# Patient Record
Sex: FEMALE | Race: WHITE | NOT HISPANIC OR LATINO | Employment: FULL TIME | URBAN - NONMETROPOLITAN AREA
[De-identification: names, ages, dates, MRNs, and addresses within clinical notes are randomized per-mention and may not be internally consistent; named-entity substitution may affect disease eponyms.]

---

## 2017-03-30 ENCOUNTER — OFFICE VISIT (OUTPATIENT)
Dept: CARDIOLOGY | Facility: CLINIC | Age: 27
End: 2017-03-30

## 2017-03-30 VITALS
HEART RATE: 80 BPM | SYSTOLIC BLOOD PRESSURE: 105 MMHG | DIASTOLIC BLOOD PRESSURE: 72 MMHG | HEIGHT: 68 IN | OXYGEN SATURATION: 100 % | BODY MASS INDEX: 24.43 KG/M2 | WEIGHT: 161.2 LBS

## 2017-03-30 DIAGNOSIS — R07.89 OTHER CHEST PAIN: Primary | ICD-10-CM

## 2017-03-30 DIAGNOSIS — R00.2 PALPITATIONS: ICD-10-CM

## 2017-03-30 DIAGNOSIS — R06.02 SHORTNESS OF BREATH: ICD-10-CM

## 2017-03-30 PROBLEM — R07.9 CHEST PAIN: Status: ACTIVE | Noted: 2017-03-30

## 2017-03-30 PROCEDURE — 99204 OFFICE O/P NEW MOD 45 MIN: CPT | Performed by: PHYSICIAN ASSISTANT

## 2017-03-30 PROCEDURE — 93000 ELECTROCARDIOGRAM COMPLETE: CPT | Performed by: PHYSICIAN ASSISTANT

## 2017-03-30 NOTE — PROGRESS NOTES
Subjective   Nesha Aviles is a 26 y.o. female     Chief Complaint   Patient presents with   • Establish Care   • Palpitations       HPI    Patient is a 26-year-old female with no history of coronary artery disease or congenital heart disease that presents to our office today to establish care referred in the setting of palpitations.    Patient has long-standing history of palpitations. Apparently she has seen Dr. muhammad in the past in which she has had stress test to perform an echocardiogram which per her report were unremarkable. We currently do not have any records. For several years she has seen Dr. Post in [place] who has been monitoring patient. Apparently she has poor Holter monitors demonstrating premature beats by description. Again we have no records. She had been on beta blocker in the past and had failed that therapy.    She describes to me recently expressing more palpitations. She describes an irregular heartbeat that will occur sometimes after activity but she does notice it as rest as well. Her palpitations to cause moderate discomfort and dizziness. She will have mild dyspnea during palpitations but has no significant dyspnea with exertion or at baseline. She has no chest discomfort outside of palpitations. She denies PND orthopnea. Otherwise, she voices no other complaints      No current outpatient prescriptions on file.     No current facility-administered medications for this visit.        Review of patient's allergies indicates no known allergies.    Past Medical History:   Diagnosis Date   • Arrhythmia    • Hypothyroidism     when younger       Social History     Social History   • Marital status:      Spouse name: N/A   • Number of children: N/A   • Years of education: N/A     Occupational History   • Not on file.     Social History Main Topics   • Smoking status: Never Smoker   • Smokeless tobacco: Not on file   • Alcohol use No   • Drug use: No   • Sexual activity: Not on  "file     Other Topics Concern   • Not on file     Social History Narrative   • No narrative on file       @[facility]@    Review of Systems   Constitutional: Positive for fatigue.   HENT: Negative.    Eyes: Positive for visual disturbance (glasses and contacts).   Respiratory: Positive for shortness of breath.    Cardiovascular: Positive for chest pain (off and on) and palpitations. Negative for leg swelling.   Gastrointestinal: Negative.    Endocrine: Negative.    Genitourinary: Negative.    Musculoskeletal: Negative.    Skin: Negative.    Allergic/Immunologic: Negative.    Neurological: Negative.    Hematological: Negative.    Psychiatric/Behavioral: Negative.        Objective     /72 (BP Location: Left arm, Patient Position: Sitting)  Pulse 80  Ht 68\" (172.7 cm)  Wt 161 lb 3.2 oz (73.1 kg)  SpO2 100%  BMI 24.51 kg/m2    Lab Results (most recent)     None          Physical Exam   Constitutional: She is oriented to person, place, and time. She appears well-developed and well-nourished. No distress.   HENT:   Head: Normocephalic and atraumatic.   Eyes: EOM are normal. Pupils are equal, round, and reactive to light.   Neck: No JVD present.   Cardiovascular: Normal rate, regular rhythm and normal heart sounds.  Exam reveals no gallop and no friction rub.    No murmur heard.  Pulmonary/Chest: Effort normal and breath sounds normal. No respiratory distress. She has no wheezes. She has no rales. She exhibits no tenderness.   Abdominal: Soft.   Musculoskeletal: Normal range of motion. She exhibits no edema.   Neurological: She is alert and oriented to person, place, and time. No cranial nerve deficit.   Skin: Skin is warm and dry. No rash noted. No erythema. No pallor.   Psychiatric: She has a normal mood and affect. Her behavior is normal.   Nursing note and vitals reviewed.      Procedure     ECG 12 Lead  Date/Time: 3/30/2017 9:24 AM  Performed by: SKYLER BEY  Authorized by: SKYLER BEY "   Comments: EKG indication palpitations    EKG demonstrates sinus mechanism at 73 bpm, normal axis, no acute ST changes and otherwise normal                 Assessment/Plan     Problems Addressed this Visit        Cardiovascular and Mediastinum    Palpitations    Relevant Orders    ECG 12 Lead    Cardiac Event Monitor    Thyroid Panel With TSH    Comprehensive Metabolic Panel    CBC & Differential    Magnesium       Respiratory    Shortness of breath    Relevant Orders    Cardiac Event Monitor    Thyroid Panel With TSH    Comprehensive Metabolic Panel    CBC & Differential    Magnesium       Nervous and Auditory    Chest pain - Primary    Relevant Orders    ECG 12 Lead    Cardiac Event Monitor    Thyroid Panel With TSH    Comprehensive Metabolic Panel    CBC & Differential    Magnesium                Recommendations  1. I would like to evaluate patient's arrhythmic substrate further with event monitoring. By description, it does appear that her symptoms seem most consistent with premature beat such as PVCs or PACs, we would like to verify because of the worsening intensity of palpitations and symptoms of dizziness.  2. In regards to her chest pain, I feel that it is very likely that ischemia was driving patient's symptoms as moderate discomfort is only fell when she has palpitations. Chest pain is atypical, risk factors are not present, and patient has had workup in the past with the last 2-3 years including echocardiogram. We would like to obtain records, for now I will not perform any diagnostic imaging a 2 week and review records and review event monitoring.  3. I would like to do a routine workup as she states she is not had recent blood work performed. We would like to rule out thyroid abnormalities, electrolyte abnormalities etc. as a cause of patient's palpitations.  4. We will see her back for follow-up of above testing. Follow-up with primary as scheduled

## 2017-04-16 ENCOUNTER — OUTSIDE FACILITY SERVICE (OUTPATIENT)
Dept: CARDIOLOGY | Facility: CLINIC | Age: 27
End: 2017-04-16

## 2017-04-16 PROCEDURE — 93272 ECG/REVIEW INTERPRET ONLY: CPT | Performed by: INTERNAL MEDICINE

## 2017-05-09 ENCOUNTER — OFFICE VISIT (OUTPATIENT)
Dept: CARDIOLOGY | Facility: CLINIC | Age: 27
End: 2017-05-09

## 2017-05-09 VITALS
BODY MASS INDEX: 24.46 KG/M2 | OXYGEN SATURATION: 100 % | WEIGHT: 161.4 LBS | HEART RATE: 81 BPM | HEIGHT: 68 IN | DIASTOLIC BLOOD PRESSURE: 63 MMHG | SYSTOLIC BLOOD PRESSURE: 99 MMHG

## 2017-05-09 DIAGNOSIS — R06.02 SHORTNESS OF BREATH: ICD-10-CM

## 2017-05-09 DIAGNOSIS — R00.0 TACHYCARDIA: ICD-10-CM

## 2017-05-09 DIAGNOSIS — R00.2 PALPITATIONS: Primary | ICD-10-CM

## 2017-05-09 PROCEDURE — 99213 OFFICE O/P EST LOW 20 MIN: CPT | Performed by: PHYSICIAN ASSISTANT

## 2017-05-10 PROBLEM — R00.0 TACHYCARDIA: Status: ACTIVE | Noted: 2017-05-10

## 2017-08-22 ENCOUNTER — OFFICE VISIT (OUTPATIENT)
Dept: CARDIOLOGY | Facility: CLINIC | Age: 27
End: 2017-08-22

## 2017-08-22 VITALS
OXYGEN SATURATION: 100 % | DIASTOLIC BLOOD PRESSURE: 67 MMHG | HEART RATE: 88 BPM | BODY MASS INDEX: 24.86 KG/M2 | WEIGHT: 164 LBS | HEIGHT: 68 IN | SYSTOLIC BLOOD PRESSURE: 102 MMHG

## 2017-08-22 DIAGNOSIS — R60.0 LOCALIZED EDEMA: ICD-10-CM

## 2017-08-22 DIAGNOSIS — R00.2 PALPITATIONS: ICD-10-CM

## 2017-08-22 DIAGNOSIS — R06.02 SHORTNESS OF BREATH: Primary | ICD-10-CM

## 2017-08-22 DIAGNOSIS — R00.0 TACHYCARDIA: ICD-10-CM

## 2017-08-22 PROCEDURE — 99213 OFFICE O/P EST LOW 20 MIN: CPT | Performed by: PHYSICIAN ASSISTANT

## 2017-08-22 NOTE — PROGRESS NOTES
Problem list     Subjective   Nesha Aviles is a 27 y.o. female     Chief Complaint   Patient presents with   • Palpitations   • Edema     michael. in calves   • Rapid Heart Rate       HPI  The patient presents in today for routine evaluation.  We've seen her in the setting of tachycardia and palpitations in the past.  She had an event monitor which indicated sinus tachycardia, correlating with symptoms.  We reviewed the possibility of institution of low-dose beta blocker for treatment of symptoms.  The patient felt that symptoms were so minimal that she did not want to pursue treatment.  She continues to have intermittent tachycardia and palpitations otherwise.  She reports that this is fairly mild.  Her big concern at this point is with edema.  She notes by the end of the day increasing edema, pitting by description.  She has slight increase in dyspnea.  She has no PND or orthopnea.  She reports that her edema has resolved by the morning.  I again discussed consideration for low-dose diuretic therapy.  She does not want to pursue that either.  She would rather pursue physical maneuvers, for which she is well versed.  She has only rare chest pain, which she feels is anxiety related to current symptoms.  She did have labs earlier this year which indicated mild anemia, iron deficiency pattern.  Thyroid, chemistry, and CBC findings otherwise were unremarkable.      No outpatient encounter prescriptions on file as of 8/22/2017.     No facility-administered encounter medications on file as of 8/22/2017.        Review of patient's allergies indicates no known allergies.    Past Medical History:   Diagnosis Date   • Arrhythmia    • Hypothyroidism     when younger       Social History     Social History   • Marital status:      Spouse name: N/A   • Number of children: N/A   • Years of education: N/A     Occupational History   • Not on file.     Social History Main Topics   • Smoking status: Never Smoker   • Smokeless  "tobacco: Not on file   • Alcohol use No   • Drug use: No   • Sexual activity: Not on file     Other Topics Concern   • Not on file     Social History Narrative       Family History   Problem Relation Age of Onset   • Hypertension Mother    • No Known Problems Father        Review of Systems   Constitutional: Positive for fatigue.   HENT: Negative.    Eyes: Positive for visual disturbance (glasses).   Respiratory: Negative.    Cardiovascular: Positive for chest pain (\" a little bit of uncomfortable\"), palpitations and leg swelling (michael. in calves).   Gastrointestinal: Negative.    Endocrine: Negative.    Genitourinary: Negative.    Musculoskeletal: Negative.    Skin: Negative.    Allergic/Immunologic: Negative.    Neurological: Negative.    Hematological: Bruises/bleeds easily (michael. in calves).   Psychiatric/Behavioral: Negative.        Objective     /67 (BP Location: Left arm, Patient Position: Sitting)  Pulse 88  Ht 68\" (172.7 cm)  Wt 164 lb (74.4 kg)  SpO2 100%  BMI 24.94 kg/m2    Lab Results (most recent)     None          Physical Exam   Constitutional: She is oriented to person, place, and time. She appears well-developed and well-nourished. No distress.   HENT:   Head: Normocephalic and atraumatic.   Eyes: Conjunctivae and EOM are normal. Pupils are equal, round, and reactive to light.   Neck: Normal range of motion. Neck supple. No JVD present. No tracheal deviation present.   Cardiovascular: Normal rate, regular rhythm, normal heart sounds and intact distal pulses.    Pulmonary/Chest: Effort normal and breath sounds normal.   Abdominal: Soft. Bowel sounds are normal. She exhibits no distension and no mass. There is no tenderness. There is no rebound and no guarding.   Musculoskeletal: Normal range of motion. She exhibits no edema, tenderness or deformity.   Neurological: She is alert and oriented to person, place, and time.   Skin: Skin is warm and dry. No rash noted. No erythema. No pallor. "   Psychiatric: She has a normal mood and affect. Her behavior is normal. Judgment and thought content normal.   Nursing note and vitals reviewed.      Procedure   Procedures       Assessment/Plan     Problems Addressed this Visit        Cardiovascular and Mediastinum    Palpitations    Relevant Orders    Adult Transthoracic Echo Complete    CBC & Differential    Comprehensive Metabolic Panel    Lipid Panel    Tachycardia    Relevant Orders    Adult Transthoracic Echo Complete    CBC & Differential    Comprehensive Metabolic Panel    Lipid Panel       Respiratory    Shortness of breath - Primary    Relevant Orders    Adult Transthoracic Echo Complete    CBC & Differential    Comprehensive Metabolic Panel    Lipid Panel      Other Visit Diagnoses     Localized edema        Relevant Orders    Adult Transthoracic Echo Complete    CBC & Differential    Comprehensive Metabolic Panel    Lipid Panel           I will schedule the patient for an echo given symptoms as above.  I would also like to repeat labs.  We discussed physical maneuvers for her edema.  Also discussed consideration for diuretic therapy for her edema and even the possibility of low-dose beta blocker because of symptoms of palpitations and tachycardia.  She does not want medications at this time as she feels that she is doing too well.  Should her clinical course change, she will called us.  We'll see her after echocardiogram findings are known and recommend further to her at that time.  Otherwise, I feel nothing further is indicated.

## 2017-09-06 ENCOUNTER — HOSPITAL ENCOUNTER (OUTPATIENT)
Dept: CARDIOLOGY | Facility: HOSPITAL | Age: 27
Discharge: HOME | End: 2017-09-06
Admitting: PHYSICIAN ASSISTANT

## 2017-09-06 ENCOUNTER — OUTSIDE FACILITY SERVICE (OUTPATIENT)
Dept: CARDIOLOGY | Facility: CLINIC | Age: 27
End: 2017-09-06

## 2017-09-06 PROCEDURE — 93306 TTE W/DOPPLER COMPLETE: CPT

## 2017-09-06 PROCEDURE — 93306 TTE W/DOPPLER COMPLETE: CPT | Performed by: INTERNAL MEDICINE

## 2017-09-11 ENCOUNTER — TELEPHONE (OUTPATIENT)
Dept: CARDIOLOGY | Facility: CLINIC | Age: 27
End: 2017-09-11

## 2017-09-11 NOTE — TELEPHONE ENCOUNTER
Patient aware echo results are not back yet, but that we will call her when they are available. PH,LPN

## 2017-09-11 NOTE — TELEPHONE ENCOUNTER
----- Message from Lizy Dunn LPN sent at 9/11/2017 11:03 AM EDT -----  Regarding: FW: PT CALL BACK      ----- Message -----     From: Eli Quick     Sent: 9/11/2017  10:46 AM       To: Lizy Dunn LPN  Subject: PT CALL BACK                                      Pt called about her ECHO results and is wanting to know when she needs to follow up on that. Please call [phone]

## 2017-09-12 ENCOUNTER — DOCUMENTATION (OUTPATIENT)
Dept: CARDIOLOGY | Facility: CLINIC | Age: 27
End: 2017-09-12

## 2018-02-19 ENCOUNTER — OFFICE VISIT (OUTPATIENT)
Dept: CARDIOLOGY | Facility: CLINIC | Age: 28
End: 2018-02-19

## 2018-02-19 VITALS
BODY MASS INDEX: 26.83 KG/M2 | HEART RATE: 108 BPM | WEIGHT: 177 LBS | SYSTOLIC BLOOD PRESSURE: 118 MMHG | HEIGHT: 68 IN | DIASTOLIC BLOOD PRESSURE: 73 MMHG | OXYGEN SATURATION: 100 %

## 2018-02-19 DIAGNOSIS — O26.819 PREGNANCY RELATED FATIGUE, ANTEPARTUM: ICD-10-CM

## 2018-02-19 DIAGNOSIS — R07.9 CHEST PAIN, UNSPECIFIED TYPE: ICD-10-CM

## 2018-02-19 DIAGNOSIS — R06.02 SHORTNESS OF BREATH: ICD-10-CM

## 2018-02-19 DIAGNOSIS — R00.2 PALPITATIONS: Primary | ICD-10-CM

## 2018-02-19 DIAGNOSIS — Z3A.30 30 WEEKS GESTATION OF PREGNANCY: ICD-10-CM

## 2018-02-19 PROCEDURE — 99213 OFFICE O/P EST LOW 20 MIN: CPT | Performed by: PHYSICIAN ASSISTANT

## 2018-02-19 PROCEDURE — 93000 ELECTROCARDIOGRAM COMPLETE: CPT | Performed by: PHYSICIAN ASSISTANT

## 2018-02-19 RX ORDER — PRENATAL VIT NO.126/IRON/FOLIC 28MG-0.8MG
TABLET ORAL DAILY
COMMUNITY
End: 2018-12-12

## 2018-02-19 NOTE — PROGRESS NOTES
Problem list     Subjective   Nesha Aviles is a 27 y.o. female     Chief Complaint   Patient presents with   • Follow-up     testing f/u, patient is 30 weeks gestation    • Results     3-6 month echo follow up    • Chest Pain     intermittent, onset 4-5 months    • Shortness of Breath     with over exertion    • Rapid Heart Rate       HPI    Patient is a 27-year-old female that presents back for follow-up.  She has no history of normal LV function.  Echocardiogram in September 2017 normal.  She has inappropriate sinus tachycardia and occasional lower extremity edema.    Patient describes feeling well.  She has chest discomfort localized on her chest to the parasternal region.  It is sharp in nature and occurs and resolves spontaneously.  She has very minimal dyspnea when exerting but nothing progressive.  Denies PND orthopnea.  She does palpitate at times but no dizziness presyncope or syncope.  She is currently 30 weeks gestation and otherwise is doing well      Outpatient Encounter Prescriptions as of 2/19/2018   Medication Sig Dispense Refill   • IRON, FERROUS GLUCONATE, PO Take  by mouth Daily.     • Prenatal Vit-Fe Fumarate-FA (PRENATAL, CLASSIC, VITAMIN) 28-0.8 MG tablet tablet Take  by mouth Daily.       No facility-administered encounter medications on file as of 2/19/2018.        Review of patient's allergies indicates no known allergies.    Past Medical History:   Diagnosis Date   • Arrhythmia    • Hypothyroidism     when younger       Social History     Social History   • Marital status:      Spouse name: N/A   • Number of children: N/A   • Years of education: N/A     Occupational History   • Not on file.     Social History Main Topics   • Smoking status: Never Smoker   • Smokeless tobacco: Not on file   • Alcohol use No   • Drug use: No   • Sexual activity: Not on file     Other Topics Concern   • Not on file     Social History Narrative       Family History   Problem Relation Age of Onset   •  "Hypertension Mother    • No Known Problems Father        Review of Systems   Constitutional: Positive for fatigue. Negative for chills, diaphoresis, fever and unexpected weight change.   HENT: Negative.  Negative for congestion, dental problem, drooling, facial swelling, postnasal drip, rhinorrhea, sinus pain, sinus pressure, sneezing, sore throat, tinnitus, trouble swallowing and voice change.    Eyes: Positive for visual disturbance (wears glasses).   Respiratory: Positive for chest tightness and shortness of breath. Negative for cough, choking, wheezing and stridor.    Cardiovascular: Positive for chest pain (onseyt 4-5 months ago, intermittent ), palpitations (tachycardia ) and leg swelling (pregnancy edema).   Gastrointestinal: Negative.  Negative for abdominal pain, blood in stool (no melena, no hematuria, no hematemesis, no hemtocheiza), constipation, diarrhea, nausea and vomiting.   Endocrine: Negative.  Negative for cold intolerance and heat intolerance.   Genitourinary: Positive for frequency. Negative for difficulty urinating, flank pain, hematuria and urgency.   Musculoskeletal: Negative.  Negative for arthralgias, back pain, gait problem, joint swelling, myalgias, neck pain and neck stiffness.   Skin: Negative.  Negative for color change, pallor, rash and wound.   Allergic/Immunologic: Negative.  Negative for environmental allergies, food allergies and immunocompromised state.   Neurological: Negative.  Negative for dizziness, tremors, seizures, syncope, facial asymmetry (no stroke like symptoms), speech difficulty, weakness, light-headedness, numbness and headaches.   Hematological: Negative.  Negative for adenopathy. Does not bruise/bleed easily.   Psychiatric/Behavioral: Negative.        Objective   Vitals:    02/19/18 1509   BP: 118/73   BP Location: Left arm   Patient Position: Sitting   Pulse: 108   SpO2: 100%   Weight: 80.3 kg (177 lb)   Height: 172.7 cm (68\")      /73 (BP Location: Left " "arm, Patient Position: Sitting)  Pulse 108  Ht 172.7 cm (68\")  Wt 80.3 kg (177 lb)  SpO2 100%  BMI 26.91 kg/m2    Lab Results (most recent)     None          Physical Exam   Constitutional: She is oriented to person, place, and time. She appears well-developed and well-nourished. No distress.   HENT:   Head: Normocephalic and atraumatic.   Eyes: EOM are normal. Pupils are equal, round, and reactive to light.   Neck: No JVD present.   Cardiovascular: Normal rate, regular rhythm, normal heart sounds and intact distal pulses.  Exam reveals no gallop and no friction rub.    No murmur heard.  Pulmonary/Chest: Effort normal and breath sounds normal. No respiratory distress. She has no wheezes. She has no rales. She exhibits no tenderness.   Musculoskeletal: Normal range of motion. She exhibits no edema.   Neurological: She is alert and oriented to person, place, and time. No cranial nerve deficit.   Skin: Skin is warm and dry. No rash noted. No erythema. No pallor.   Psychiatric: She has a normal mood and affect. Her behavior is normal.   Nursing note and vitals reviewed.      Procedure     ECG 12 Lead  Date/Time: 2/19/2018 3:12 PM  Performed by: SKYLER BEY  Authorized by: SKYLER BEY   Comments: EKG demonstrates sinus rhythm at 96 bpm, normal axis, no acute ST changes               Assessment/Plan     Problems Addressed this Visit        Cardiovascular and Mediastinum    Palpitations - Primary    Relevant Orders    ECG 12 Lead       Respiratory    Shortness of breath    Relevant Orders    ECG 12 Lead       Nervous and Auditory    Chest pain    Relevant Orders    ECG 12 Lead      Other Visit Diagnoses     30 weeks gestation of pregnancy        Relevant Orders    ECG 12 Lead    Pregnancy related fatigue, antepartum        Relevant Orders    ECG 12 Lead           recommendation  1.  Patient feeling remarkably well.  Palpitations are minimal.  Chest pain is mild.  It is atypical at this time.  Shortness of " breath is minimal.  For now no further cardiac workup.  We will see her back for follow-up in 6 month to year.  Follow-up primary as scheduled       Electronically signed by:

## 2018-12-12 ENCOUNTER — OFFICE VISIT (OUTPATIENT)
Dept: CARDIOLOGY | Facility: CLINIC | Age: 28
End: 2018-12-12

## 2018-12-12 VITALS
HEIGHT: 68 IN | BODY MASS INDEX: 25.73 KG/M2 | WEIGHT: 169.8 LBS | SYSTOLIC BLOOD PRESSURE: 111 MMHG | HEART RATE: 74 BPM | OXYGEN SATURATION: 100 % | DIASTOLIC BLOOD PRESSURE: 45 MMHG

## 2018-12-12 DIAGNOSIS — R00.2 PALPITATIONS: Primary | ICD-10-CM

## 2018-12-12 DIAGNOSIS — R06.02 SHORTNESS OF BREATH: ICD-10-CM

## 2018-12-12 DIAGNOSIS — R07.9 CHEST PAIN, UNSPECIFIED TYPE: ICD-10-CM

## 2018-12-12 PROCEDURE — 93000 ELECTROCARDIOGRAM COMPLETE: CPT | Performed by: PHYSICIAN ASSISTANT

## 2018-12-12 PROCEDURE — 99214 OFFICE O/P EST MOD 30 MIN: CPT | Performed by: PHYSICIAN ASSISTANT

## 2018-12-12 NOTE — PROGRESS NOTES
Problem list     Subjective   Nesha Aviles is a 28 y.o. female     Chief Complaint   Patient presents with   • Follow-up     HERE FOR YEARLY F/U   • Palpitations       HPI    Patient is a 28-year-old female that presents back for follow-up.  She has been followed in the setting of chronic palpitations.  Christian has been doing well with exception of palpitations.  She notices occasional palpitations.  She notices an irregular heartbeat at times.  She had event monitor in 2017 demonstrating sinus tachycardia and occasional PACs.  This has just been monitored.    Patient has chest discomfort during palpitations.  She develops a sharp pain under her left breast that radiates up.  This can be expressed with palpitations.  Dyspnea is mild but nothing progressive.  No PND orthopnea.  Patient otherwise is doing well      Outpatient Encounter Medications as of 12/12/2018   Medication Sig Dispense Refill   • [DISCONTINUED] IRON, FERROUS GLUCONATE, PO Take  by mouth Daily.     • [DISCONTINUED] Prenatal Vit-Fe Fumarate-FA (PRENATAL, CLASSIC, VITAMIN) 28-0.8 MG tablet tablet Take  by mouth Daily.       No facility-administered encounter medications on file as of 12/12/2018.        Patient has no known allergies.    Past Medical History:   Diagnosis Date   • Arrhythmia    • Hypothyroidism     when younger       Social History     Socioeconomic History   • Marital status:      Spouse name: Not on file   • Number of children: Not on file   • Years of education: Not on file   • Highest education level: Not on file   Social Needs   • Financial resource strain: Not on file   • Food insecurity - worry: Not on file   • Food insecurity - inability: Not on file   • Transportation needs - medical: Not on file   • Transportation needs - non-medical: Not on file   Occupational History   • Not on file   Tobacco Use   • Smoking status: Never Smoker   • Smokeless tobacco: Never Used   Substance and Sexual Activity   • Alcohol use: No   •  "Drug use: No   • Sexual activity: Not on file   Other Topics Concern   • Not on file   Social History Narrative   • Not on file       Family History   Problem Relation Age of Onset   • Hypertension Mother    • No Known Problems Father        Review of Systems   Constitutional: Positive for fatigue.   HENT: Negative.    Eyes: Positive for visual disturbance (wears glasses).   Respiratory: Negative.  Negative for shortness of breath.    Cardiovascular: Positive for palpitations (more than usual ). Negative for chest pain and leg swelling.   Gastrointestinal: Negative.    Endocrine: Negative.    Genitourinary: Negative.    Musculoskeletal: Negative.    Skin: Negative.    Allergic/Immunologic: Negative.    Neurological: Negative.    Hematological: Negative.    Psychiatric/Behavioral: Positive for agitation and sleep disturbance (insomnia). The patient is nervous/anxious.    All other systems reviewed and are negative.      Objective   Vitals:    12/12/18 1105   BP: 111/45   BP Location: Left arm   Patient Position: Sitting   Pulse: 74   SpO2: 100%   Weight: 77 kg (169 lb 12.8 oz)   Height: 172.7 cm (67.99\")      /45 (BP Location: Left arm, Patient Position: Sitting)   Pulse 74   Ht 172.7 cm (67.99\")   Wt 77 kg (169 lb 12.8 oz)   SpO2 100%   BMI 25.82 kg/m²     Lab Results (most recent)     None          Physical Exam   Constitutional: She is oriented to person, place, and time. She appears well-developed and well-nourished. No distress.   HENT:   Head: Normocephalic and atraumatic.   Eyes: EOM are normal. Pupils are equal, round, and reactive to light.   Neck: No JVD present.   Cardiovascular: Normal rate, regular rhythm, normal heart sounds and intact distal pulses. Exam reveals no gallop and no friction rub.   No murmur heard.  Pulmonary/Chest: Effort normal and breath sounds normal. No respiratory distress. She has no wheezes. She has no rales. She exhibits no tenderness.   Musculoskeletal: Normal range of " motion. She exhibits no edema.   Neurological: She is alert and oriented to person, place, and time. No cranial nerve deficit.   Skin: Skin is warm and dry. No rash noted. No erythema. No pallor.   Psychiatric: She has a normal mood and affect. Her behavior is normal.   Nursing note and vitals reviewed.      Procedure     ECG 12 Lead  Date/Time: 12/12/2018 11:09 AM  Performed by: Lito Sequeira PA  Authorized by: Lito Sequeira PA   Comments: EKG demonstrates sinus rhythm at 81 bpm with no acute ST changes               Assessment/Plan     Problems Addressed this Visit        Cardiovascular and Mediastinum    Palpitations - Primary    Relevant Orders    ECG 12 Lead    Cardiac Event Monitor       Respiratory    Shortness of breath    Relevant Orders    Cardiac Event Monitor       Nervous and Auditory    Chest pain    Relevant Orders    Cardiac Event Monitor           recommendation  1.  We discussed options in regards to the patient.  She would like to repeat an event monitor because of frequent tachycardia palpitations symptoms.  Otherwise, chest pain does not appear ischemic and risk factors are low.  I would like to schedule for monitoring and see her back for follow-up.  She will follow-up primary as scheduled                Patient's Body mass index is 25.82 kg/m². BMI is within normal parameters. No follow-up required.       Electronically signed by:

## 2019-02-14 ENCOUNTER — OFFICE VISIT (OUTPATIENT)
Dept: CARDIOLOGY | Facility: CLINIC | Age: 29
End: 2019-02-14

## 2019-02-14 VITALS
WEIGHT: 167.4 LBS | SYSTOLIC BLOOD PRESSURE: 117 MMHG | BODY MASS INDEX: 25.37 KG/M2 | OXYGEN SATURATION: 96 % | HEIGHT: 68 IN | HEART RATE: 78 BPM | DIASTOLIC BLOOD PRESSURE: 65 MMHG

## 2019-02-14 DIAGNOSIS — Z00.00 HEALTHCARE MAINTENANCE: ICD-10-CM

## 2019-02-14 DIAGNOSIS — R00.2 PALPITATIONS: ICD-10-CM

## 2019-02-14 DIAGNOSIS — D64.9 ANEMIA, UNSPECIFIED TYPE: ICD-10-CM

## 2019-02-14 DIAGNOSIS — R06.02 SHORTNESS OF BREATH: ICD-10-CM

## 2019-02-14 DIAGNOSIS — R07.2 PRECORDIAL PAIN: Primary | ICD-10-CM

## 2019-02-14 DIAGNOSIS — R00.0 TACHYCARDIA: ICD-10-CM

## 2019-02-14 PROCEDURE — 99213 OFFICE O/P EST LOW 20 MIN: CPT | Performed by: NURSE PRACTITIONER

## 2019-02-14 PROCEDURE — 93000 ELECTROCARDIOGRAM COMPLETE: CPT | Performed by: NURSE PRACTITIONER

## 2019-02-14 NOTE — PROGRESS NOTES
Subjective   Nesha Aviles is a 28 y.o. female     Chief Complaint   Patient presents with   • Follow-up     Pt in office due to increased palps    • Palpitations       HPI    Problem List:     1. Palpitations/tachycardia  1.1 event monitor 4/10-17-sinus rhythm, PACs, sinus tach   2. Shortness of breath   2.1 Echo 17-EF 55-60%, trace MR, physiological TR, PA 15-20  3. Chest Pain    Patient is a 28-year-old female who presents today for a follow-up.  Patient says that she has been having midsternum chest pain that she describes as a sharp/tightness that goes from epigastric region to the bottom of her throat.  She says that she has no other symptoms when it occurs.  She says it can occur at any time but she mostly notices at night time.  She said she did go to her PCP and they told her they do not think it has anything to do with her esophagus.  Patient states that when she was pregnant with her third child she started having increased heart rate.  She says when she had her fourth child did continue to progressively get worse.  She says now she feels that all the time.  She says when she even starts to have activity her heart wll automatically start racing.  She says when her heart rate tries to come back down she says and will fill like it flip flops or what might be a forceful feeling.  She says she will always just stop and rest.  She says because of this she gets really anxious and tries not to do much activity at all.  She denies any dizziness, presyncope, syncope, orthopnea, PND or edema.  She says she has shortness of breath that mostly will occur when she's resting.  She says it may be anxiety but she is not sure.  She says it's more a feeling like she's having a hard time getting her breath.  She has not had any blood work in sometime.  She says she does have a history of anemia.  Patient's maternal grandmother and maternal grandfather both  from MIs.  Her grandmother was 56 and grandfather was  "in his 60s.  Her mom has hypertension and high cholesterol.  She is still alive and 62 years old.    No current outpatient medications on file.     No current facility-administered medications for this visit.        ALLERGIES    Patient has no known allergies.    Past Medical History:   Diagnosis Date   • Arrhythmia    • Hypothyroidism     when younger       Social History     Socioeconomic History   • Marital status:      Spouse name: Not on file   • Number of children: Not on file   • Years of education: Not on file   • Highest education level: Not on file   Social Needs   • Financial resource strain: Not on file   • Food insecurity - worry: Not on file   • Food insecurity - inability: Not on file   • Transportation needs - medical: Not on file   • Transportation needs - non-medical: Not on file   Occupational History   • Not on file   Tobacco Use   • Smoking status: Never Smoker   • Smokeless tobacco: Never Used   Substance and Sexual Activity   • Alcohol use: No   • Drug use: No   • Sexual activity: Not on file   Other Topics Concern   • Not on file   Social History Narrative   • Not on file       Family History   Problem Relation Age of Onset   • Hypertension Mother    • No Known Problems Father        Review of Systems   Constitutional: Positive for fatigue (makes worse with CP and palpitations ). Negative for diaphoresis.   HENT: Negative for congestion, rhinorrhea and sore throat.    Eyes: Positive for visual disturbance (glasses daily ).   Respiratory: Positive for chest tightness (\"random CP\" on breast bone. Sharp brief/localized and goes from epigastric to throat. No increased SoA, non-radiating; notices it more at night, but can occur at anytime ) and shortness of breath (Pt states \"at rest, mostly\"; unsure if anxiety related; feels like has hard time getting breath).    Cardiovascular: Positive for chest pain (\"random CP\" on breast bone. Sharp brief/localized and goes from epigastric to throat. " "No increased SoA, non-radiating; notices it more at night, but can occur at anytime  ) and palpitations (several x/day; increased HR since 3rd and 4th pregnancies, progressively gotten worse, when she has had an increased HR and then it tries to come back down then her heart will flip flop/forceful; feels heart vibrating but pulse fine at night). Negative for leg swelling.   Gastrointestinal: Negative for abdominal pain, constipation, diarrhea, nausea and vomiting.   Endocrine: Positive for cold intolerance. Negative for heat intolerance.   Genitourinary: Negative for difficulty urinating, dysuria, frequency and urgency.   Musculoskeletal: Positive for neck pain. Negative for arthralgias and back pain.   Skin: Negative for rash and wound.   Allergic/Immunologic: Negative for environmental allergies and food allergies.   Neurological: Negative for dizziness, syncope, weakness, light-headedness, numbness and headaches.   Hematological: Does not bruise/bleed easily.   Psychiatric/Behavioral: Positive for sleep disturbance (insomnia. Denies SoA at Hs ).       Objective   /65   Pulse 78   Ht 172.7 cm (68\")   Wt 75.9 kg (167 lb 6.4 oz)   SpO2 96%   BMI 25.45 kg/m²   Vitals:    02/14/19 0836   BP: 117/65   Pulse: 78   SpO2: 96%   Weight: 75.9 kg (167 lb 6.4 oz)   Height: 172.7 cm (68\")      Lab Results (most recent)     None        Physical Exam   Constitutional: She is oriented to person, place, and time. Vital signs are normal. She appears well-developed and well-nourished. She is active and cooperative.   HENT:   Head: Normocephalic.   Eyes: Lids are normal.   Wears glasses    Neck: Normal carotid pulses, no hepatojugular reflux and no JVD present. Carotid bruit is not present.   Cardiovascular: Normal rate, regular rhythm and normal heart sounds.   Pulses:       Radial pulses are 2+ on the right side, and 2+ on the left side.        Dorsalis pedis pulses are 2+ on the right side, and 2+ on the left side.     "    Posterior tibial pulses are 2+ on the right side, and 2+ on the left side.   No edema BLE.   Pulmonary/Chest: Effort normal and breath sounds normal.   Abdominal: Normal appearance and bowel sounds are normal.   Neurological: She is alert and oriented to person, place, and time.   Skin: Skin is warm, dry and intact.   Psychiatric: She has a normal mood and affect. Her speech is normal and behavior is normal. Judgment and thought content normal. Cognition and memory are normal.       Procedure     ECG 12 Lead  Date/Time: 2/14/2019 9:41 AM  Performed by: Shoshana Casey APRN  Authorized by: Shoshana Casey APRN   Comparison: compared with previous ECG from 12/12/2018  Rhythm: sinus rhythm and sinus arrhythmia  Rate: normal  BPM: 87  T wave flattening noted on lead: nonspecific changes   QRS axis: normal    Clinical impression: non-specific ECG                 Assessment/Plan      Diagnosis Plan   1. Precordial pain  Stress Test With Myocardial Perfusion One Day    Adult Transthoracic Echo Complete W/ Cont if Necessary Per Protocol    Comprehensive Metabolic Panel    Lipid Panel   2. Palpitations  Stress Test With Myocardial Perfusion One Day    Adult Transthoracic Echo Complete W/ Cont if Necessary Per Protocol    TSH    Magnesium   3. Tachycardia  Stress Test With Myocardial Perfusion One Day    Adult Transthoracic Echo Complete W/ Cont if Necessary Per Protocol    TSH    Magnesium   4. Shortness of breath  Stress Test With Myocardial Perfusion One Day    Adult Transthoracic Echo Complete W/ Cont if Necessary Per Protocol   5. Healthcare maintenance  CBC (No Diff)    Comprehensive Metabolic Panel    TSH    Magnesium    Lipid Panel   6. Anemia, unspecified type  CBC (No Diff)    TSH       Return in about 9 weeks (around 4/18/2019).    Chest pain/palpitations/tachycardia/shortness of breath-patient will have ischemia workup, stress and echo.  I recommend she start taking aspirin 81 mg daily.  She does not like to  take medications and is initially 1 take anything for the palpitations at this time.  She does not feel it necessary to wear another event monitor either.  She will get a TSH, magnesium, lipids, CBC and CMP today.  She did have a cup cakes at her triglycerides and/or her sugar may be elevated.  She will follow-up in 9 weeks or sooner if any changes.         Patient's Body mass index is 25.45 kg/m². BMI is above normal parameters. Recommendations include: educational material.      Electronically signed by:

## 2019-02-14 NOTE — PATIENT INSTRUCTIONS
"Fat and Cholesterol Restricted Diet  Getting too much fat and cholesterol in your diet may cause health problems. Following this diet helps keep your fat and cholesterol at normal levels. This can keep you from getting sick.  What types of fat should I choose?  · Choose monosaturated and polyunsaturated fats. These are found in foods such as olive oil, canola oil, flaxseeds, walnuts, almonds, and seeds.  · Eat more omega-3 fats. Good choices include salmon, mackerel, sardines, tuna, flaxseed oil, and ground flaxseeds.  · Limit saturated fats. These are in animal products such as meats, butter, and cream. They can also be in plant products such as palm oil, palm kernel oil, and coconut oil.  · Avoid foods with partially hydrogenated oils in them. These contain trans fats. Examples of foods that have trans fats are stick margarine, some tub margarines, cookies, crackers, and other baked goods.  What general guidelines do I need to follow?  · Check food labels. Look for the words \"trans fat\" and \"saturated fat.\"  · When preparing a meal:  ? Fill half of your plate with vegetables and green salads.  ? Fill one fourth of your plate with whole grains. Look for the word \"whole\" as the first word in the ingredient list.  ? Fill one fourth of your plate with lean protein foods.  · Eat more foods that have fiber, like apples, carrots, beans, peas, and barley.  · Eat more home-cooked foods. Eat less at restaurants and buffets.  · Limit or avoid alcohol.  · Limit foods high in starch and sugar.  · Limit fried foods.  · Cook foods without frying them. Baking, boiling, grilling, and broiling are all great options.  · Lose weight if you are overweight. Losing even a small amount of weight can help your overall health. It can also help prevent diseases such as diabetes and heart disease.  What foods can I eat?  Grains  Whole grains, such as whole wheat or whole grain breads, crackers, cereals, and pasta. Unsweetened oatmeal, " bulgur, barley, quinoa, or brown rice. Corn or whole wheat flour tortillas.  Vegetables  Fresh or frozen vegetables (raw, steamed, roasted, or grilled). Green salads.  Fruits  All fresh, canned (in natural juice), or frozen fruits.  Meat and Other Protein Products  Ground beef (85% or leaner), grass-fed beef, or beef trimmed of fat. Skinless chicken or turkey. Ground chicken or turkey. Pork trimmed of fat. All fish and seafood. Eggs. Dried beans, peas, or lentils. Unsalted nuts or seeds. Unsalted canned or dry beans.  Dairy  Low-fat dairy products, such as skim or 1% milk, 2% or reduced-fat cheeses, low-fat ricotta or cottage cheese, or plain low-fat yogurt.  Fats and Oils  Tub margarines without trans fats. Light or reduced-fat mayonnaise and salad dressings. Avocado. Olive, canola, sesame, or safflower oils. Natural peanut or almond butter (choose ones without added sugar and oil).  The items listed above may not be a complete list of recommended foods or beverages. Contact your dietitian for more options.  What foods are not recommended?  Grains  White bread. White pasta. White rice. Cornbread. Bagels, pastries, and croissants. Crackers that contain trans fat.  Vegetables  White potatoes. Corn. Creamed or fried vegetables. Vegetables in a cheese sauce.  Fruits  Dried fruits. Canned fruit in light or heavy syrup. Fruit juice.  Meat and Other Protein Products  Fatty cuts of meat. Ribs, chicken wings, stoddard, sausage, bologna, salami, chitterlings, fatback, hot dogs, bratwurst, and packaged luncheon meats. Liver and organ meats.  Dairy  Whole or 2% milk, cream, half-and-half, and cream cheese. Whole milk cheeses. Whole-fat or sweetened yogurt. Full-fat cheeses. Nondairy creamers and whipped toppings. Processed cheese, cheese spreads, or cheese curds.  Sweets and Desserts  Corn syrup, sugars, honey, and molasses. Candy. Jam and jelly. Syrup. Sweetened cereals. Cookies, pies, cakes, donuts, muffins, and ice  cream.  Fats and Oils  Butter, stick margarine, lard, shortening, ghee, or stoddard fat. Coconut, palm kernel, or palm oils.  Beverages  Alcohol. Sweetened drinks (such as sodas, lemonade, and fruit drinks or punches).  The items listed above may not be a complete list of foods and beverages to avoid. Contact your dietitian for more information.  This information is not intended to replace advice given to you by your health care provider. Make sure you discuss any questions you have with your health care provider.  Document Released: 06/18/2013 Document Revised: 08/24/2017 Document Reviewed: 03/19/2015  [employer] Interactive Patient Education © 2018 [employer]  BMI for Adults  Body mass index (BMI) is a number that is calculated from a person's weight and height. In most adults, the number is used to find how much of an adult's weight is made up of fat. BMI is not as accurate as a direct measure of body fat.  How is BMI calculated?  BMI is calculated by dividing weight in kilograms by height in meters squared. It can also be calculated by dividing weight in pounds by height in inches squared, then multiplying the resulting number by 703. Charts are available to help you find your BMI quickly and easily without doing this calculation.  How is BMI interpreted?  Health care professionals use BMI charts to identify whether an adult is underweight, at a normal weight, or overweight based on the following guidelines:  · Underweight: BMI less than 18.5.  · Normal weight: BMI between 18.5 and 24.9.  · Overweight: BMI between 25 and 29.9.  · Obese: BMI of 30 and above.    BMI is usually interpreted the same for males and females.  Weight includes both fat and muscle, so someone with a muscular build, such as an athlete, may have a BMI that is higher than 24.9. In cases like these, BMI may not accurately depict body fat. To determine if excess body fat is the cause of a BMI of 25 or higher, further assessments may need to be  done by a health care provider.  Why is BMI a useful tool?  BMI is used to identify a possible weight problem that may be related to a medical problem or may increase the risk for medical problems. BMI can also be used to promote changes to reach a healthy weight.  This information is not intended to replace advice given to you by your health care provider. Make sure you discuss any questions you have with your health care provider.  Document Released: 08/29/2005 Document Revised: 04/27/2017 Document Reviewed: 05/15/2015  [employer] Interactive Patient Education © 2018 [employer]    Nonspecific Chest Pain  Chest pain can be caused by many different conditions. There is a chance that your pain could be related to something serious, such as a heart attack or a blood clot in your lungs. Chest pain can also be caused by conditions that are not life-threatening. If you have chest pain, it is very important to follow up with your doctor.  Follow these instructions at home:  Medicines  · If you were prescribed an antibiotic medicine, take it as told by your doctor. Do not stop taking the antibiotic even if you start to feel better.  · Take over-the-counter and prescription medicines only as told by your doctor.  Lifestyle  · Do not use any products that contain nicotine or tobacco, such as cigarettes and e-cigarettes. If you need help quitting, ask your doctor.  · Do not drink alcohol.  · Make lifestyle changes as told by your doctor. These may include:  ? Getting regular exercise. Ask your doctor for some activities that are safe for you.  ? Eating a heart-healthy diet. A diet specialist (dietitian) can help you to learn healthy eating options.  ? Staying at a healthy weight.  ? Managing diabetes, if needed.  ? Lowering your stress, as with deep breathing or spending time in nature.  General instructions  · Avoid any activities that make you feel chest pain.  · If your chest pain is because of heartburn:  ? Raise  (elevate) the head of your bed about 6 inches (15 cm). You can do this by putting blocks under the bed legs at the head of the bed.  ? Do not sleep with extra pillows under your head. That does not help heartburn.  · Keep all follow-up visits as told by your doctor. This is important. This includes any further testing if your chest pain does not go away.  Contact a doctor if:  · Your chest pain does not go away.  · You have a rash with blisters on your chest.  · You have a fever.  · You have chills.  Get help right away if:  · Your chest pain is worse.  · You have a cough that gets worse, or you cough up blood.  · You have very bad (severe) pain in your belly (abdomen).  · You are very weak.  · You pass out (faint).  · You have either of these for no clear reason:  ? Sudden chest discomfort.  ? Sudden discomfort in your arms, back, neck, or jaw.  · You have shortness of breath at any time.  · You suddenly start to sweat, or your skin gets clammy.  · You feel sick to your stomach (nauseous).  · You throw up (vomit).  · You suddenly feel light-headed or dizzy.  · Your heart starts to beat fast, or it feels like it is skipping beats.  These symptoms may be an emergency. Do not wait to see if the symptoms will go away. Get medical help right away. Call your local emergency services (911 in the [place] Do not drive yourself to the hospital.  This information is not intended to replace advice given to you by your health care provider. Make sure you discuss any questions you have with your health care provider.  Document Released: 06/05/2009 Document Revised: 09/11/2017 Document Reviewed: 09/11/2017  [employer] Interactive Patient Education © 2017 [employer]    Premature Atrial Contraction  A premature atrial contraction (PAC) is a kind of irregular heartbeat (arrhythmia). It happens when the heart beats too early and then pauses before beating again. PACs are also called skipped heartbeats because they may make you feel  like your heart is stopping for a second, even though the heart does not actually skip a beat.  The heart has four areas, or chambers. Normally, electrical signals spread across the heart and make all the chambers beat together. During a PAC, the upper chambers of the heart (right atrium and left atrium) beat too early, before they have had time to fill with blood. The heartbeat pauses afterward so the heart can fill with blood for the next beat.  What are the causes?  The cause of this condition is often unknown. Sometimes it is caused by heart disease or injury to the heart.  What increases the risk?  This condition is more likely to develop in adults who are 50 years of age or older and in children. Episodes may be triggered by:  · Caffeine.  · Stress.  · Tiredness.  · Alcohol.  · Smoking.  · Stimulant drugs.  · Heart disease.    What are the signs or symptoms?  Symptoms of this condition include:  · A feeling that your heart skipped a beat. The first heartbeat after the “skipped” beat may feel more forceful.  · A feeling that your heart is fluttering.    How is this diagnosed?  This condition is diagnosed based on:  · Your symptoms.  · A physical exam. Your health care provider may listen to your heart.  · Tests to rule out other conditions, such as a test that records the electrical impulses of the heart and assesses heart health (electrocardiogram, or ECG). If you have an ECG, you may need to wear a portable ECG machine (Holter monitor) that records your heart beats for 24 hours or more.    How is this treated?    Usually, treatment is not needed for this condition. If you have episodes that happen often or if a cause is found, you may receive treatment for the underlying cause of your PACs.  Follow these instructions at home:  Lifestyle  Follow these instructions as told by your health care provider:  · Do not use any products that contain nicotine or tobacco, such as cigarettes and e-cigarettes. If you need  help quitting, ask your health care provider.  · If caffeine triggers episodes, do not eat, drink, or use anything with caffeine in it.  · If caffeine does not seem to trigger episodes, consume caffeine in moderation.  · If alcohol triggers episodes of PAC, do not drink alcohol.  · If alcohol does not seem to trigger episodes, limit alcohol intake to no more than 1 drink a day for nonpregnant women and 2 drinks a day for men. One drink equals 12 oz of beer, 5 oz of wine, or 1½ oz of hard liquor.  · Exercise regularly. Ask your health care provider what type of exercise is safe for you.  · Find healthy ways to manage stress. Avoid stressful situations when possible.  · Try to get at least 7-9 hours of sleep each night, or as much as recommended by your health care provider.  · Do not use illegal drugs.    General instructions  · Take over-the-counter and prescription medicines only as told by your health care provider.  · Keep all follow-up visits as told by your health care provider. This is important.  Contact a health care provider if:  · You feel your heart skipping beats more than once a day.  · Your heart skips beats and you feel dizzy, light-headed, or very tired.  Get help right away if:  · You have chest pain.  · You have trouble breathing.  This information is not intended to replace advice given to you by your health care provider. Make sure you discuss any questions you have with your health care provider.  Document Released: 08/21/2015 Document Revised: 08/15/2017 Document Reviewed: 06/16/2017  [employer] Interactive Patient Education © 2018 [employer]

## 2019-03-13 ENCOUNTER — HOSPITAL ENCOUNTER (OUTPATIENT)
Dept: CARDIOLOGY | Facility: HOSPITAL | Age: 29
Discharge: HOME | End: 2019-03-13

## 2019-03-13 ENCOUNTER — LAB (OUTPATIENT)
Dept: LAB | Facility: HOSPITAL | Age: 29
End: 2019-03-13

## 2019-03-13 DIAGNOSIS — R07.2 PRECORDIAL PAIN: ICD-10-CM

## 2019-03-13 DIAGNOSIS — R00.2 PALPITATIONS: ICD-10-CM

## 2019-03-13 DIAGNOSIS — R06.02 SHORTNESS OF BREATH: ICD-10-CM

## 2019-03-13 DIAGNOSIS — D64.9 ANEMIA, UNSPECIFIED TYPE: ICD-10-CM

## 2019-03-13 DIAGNOSIS — R00.0 TACHYCARDIA: ICD-10-CM

## 2019-03-13 DIAGNOSIS — Z00.00 HEALTHCARE MAINTENANCE: ICD-10-CM

## 2019-03-13 LAB
ALBUMIN SERPL-MCNC: 4.48 G/DL (ref 3.5–5.2)
ALBUMIN/GLOB SERPL: 1.6 G/DL
ALP SERPL-CCNC: 75 U/L (ref 39–117)
ALT SERPL W P-5'-P-CCNC: 8 U/L (ref 1–33)
ANION GAP SERPL CALCULATED.3IONS-SCNC: 9.9 MMOL/L
AST SERPL-CCNC: 15 U/L (ref 1–32)
BILIRUB SERPL-MCNC: 0.9 MG/DL (ref 0.1–1.2)
BUN BLD-MCNC: 7 MG/DL (ref 6–20)
BUN/CREAT SERPL: 9.9 (ref 7–25)
CALCIUM SPEC-SCNC: 9 MG/DL (ref 8.6–10.5)
CHLORIDE SERPL-SCNC: 103 MMOL/L (ref 98–107)
CHOLEST SERPL-MCNC: 141 MG/DL (ref 0–200)
CO2 SERPL-SCNC: 25.1 MMOL/L (ref 22–29)
CREAT BLD-MCNC: 0.71 MG/DL (ref 0.57–1)
DEPRECATED RDW RBC AUTO: 45.1 FL (ref 37–54)
ERYTHROCYTE [DISTWIDTH] IN BLOOD BY AUTOMATED COUNT: 16.7 % (ref 12.3–15.4)
GFR SERPL CREATININE-BSD FRML MDRD: 97 ML/MIN/1.73
GLOBULIN UR ELPH-MCNC: 2.7 GM/DL
GLUCOSE BLD-MCNC: 92 MG/DL (ref 65–99)
HCT VFR BLD AUTO: 37.5 % (ref 34–46.6)
HDLC SERPL-MCNC: 58 MG/DL (ref 40–60)
HGB BLD-MCNC: 11.7 G/DL (ref 12–15.9)
LDLC SERPL CALC-MCNC: 67 MG/DL (ref 0–100)
LDLC/HDLC SERPL: 1.16 {RATIO}
MAGNESIUM SERPL-MCNC: 1.9 MG/DL (ref 1.6–2.6)
MCH RBC QN AUTO: 23.9 PG (ref 26.6–33)
MCHC RBC AUTO-ENTMCNC: 31.2 G/DL (ref 31.5–35.7)
MCV RBC AUTO: 76.7 FL (ref 79–97)
PLATELET # BLD AUTO: 399 10*3/MM3 (ref 140–450)
PMV BLD AUTO: 11.3 FL (ref 6–12)
POTASSIUM BLD-SCNC: 3.6 MMOL/L (ref 3.5–5.2)
PROT SERPL-MCNC: 7.2 G/DL (ref 6–8.5)
RBC # BLD AUTO: 4.89 10*6/MM3 (ref 3.77–5.28)
SODIUM BLD-SCNC: 138 MMOL/L (ref 136–145)
TRIGL SERPL-MCNC: 80 MG/DL (ref 0–150)
TSH SERPL DL<=0.05 MIU/L-ACNC: 2.92 MIU/ML (ref 0.27–4.2)
VLDLC SERPL-MCNC: 16 MG/DL
WBC NRBC COR # BLD: 7.3 10*3/MM3 (ref 3.4–10.8)

## 2019-03-13 PROCEDURE — 0 TECHNETIUM SESTAMIBI: Performed by: INTERNAL MEDICINE

## 2019-03-13 PROCEDURE — A9500 TC99M SESTAMIBI: HCPCS | Performed by: INTERNAL MEDICINE

## 2019-03-13 PROCEDURE — 93306 TTE W/DOPPLER COMPLETE: CPT | Performed by: INTERNAL MEDICINE

## 2019-03-13 PROCEDURE — 83735 ASSAY OF MAGNESIUM: CPT | Performed by: NURSE PRACTITIONER

## 2019-03-13 PROCEDURE — 36415 COLL VENOUS BLD VENIPUNCTURE: CPT

## 2019-03-13 PROCEDURE — 80053 COMPREHEN METABOLIC PANEL: CPT | Performed by: NURSE PRACTITIONER

## 2019-03-13 PROCEDURE — 84443 ASSAY THYROID STIM HORMONE: CPT | Performed by: NURSE PRACTITIONER

## 2019-03-13 PROCEDURE — 78452 HT MUSCLE IMAGE SPECT MULT: CPT

## 2019-03-13 PROCEDURE — 93018 CV STRESS TEST I&R ONLY: CPT | Performed by: INTERNAL MEDICINE

## 2019-03-13 PROCEDURE — 78452 HT MUSCLE IMAGE SPECT MULT: CPT | Performed by: INTERNAL MEDICINE

## 2019-03-13 PROCEDURE — 80061 LIPID PANEL: CPT | Performed by: NURSE PRACTITIONER

## 2019-03-13 PROCEDURE — 93017 CV STRESS TEST TRACING ONLY: CPT

## 2019-03-13 PROCEDURE — 85027 COMPLETE CBC AUTOMATED: CPT | Performed by: NURSE PRACTITIONER

## 2019-03-13 PROCEDURE — 93306 TTE W/DOPPLER COMPLETE: CPT

## 2019-03-13 RX ADMIN — TECHNETIUM TC 99M SESTAMIBI 1 DOSE: 1 INJECTION INTRAVENOUS at 08:17

## 2019-03-13 RX ADMIN — TECHNETIUM TC 99M SESTAMIBI 1 DOSE: 1 INJECTION INTRAVENOUS at 08:16

## 2019-03-14 ENCOUNTER — TELEPHONE (OUTPATIENT)
Dept: CARDIOLOGY | Facility: CLINIC | Age: 29
End: 2019-03-14

## 2019-03-14 NOTE — TELEPHONE ENCOUNTER
Informed pt of her results and the message from Shoshana. Pt states that she does have a hx of anemia, usually when she's pregnant. I informed pt that I would route lab results to PCP and that she should contact them, she verbalized understanding.   (added hx of iron def anemia to hx list and routed results to PCP)

## 2019-03-14 NOTE — TELEPHONE ENCOUNTER
WBC 3.40 - 10.80 10*3/mm3 7.30    RBC 3.77 - 5.28 10*6/mm3 4.89    Hemoglobin 12.0 - 15.9 g/dL 11.7 Abnormally low     Hematocrit 34.0 - 46.6 % 37.5    MCV 79.0 - 97.0 fL 76.7 Abnormally low     MCH 26.6 - 33.0 pg 23.9 Abnormally low     MCHC 31.5 - 35.7 g/dL 31.2 Abnormally low     RDW 12.3 - 15.4 % 16.7 Abnormally high     RDW-SD 37.0 - 54.0 fl 45.1    MPV 6.0 - 12.0 fL 11.3    Platelets 140 - 450 10*3/mm3 399

## 2019-03-14 NOTE — TELEPHONE ENCOUNTER
----- Message from ABEL Guillen sent at 3/14/2019 11:07 AM EDT -----  Looks like pt has some anemia. Check with her and forward to PCP. If no known hx of anemia, have her follow up with PCP.     ----- Message -----  From: Lab, Background User  Sent: 3/13/2019   1:50 PM  To: ABEL Guillen

## 2019-03-17 LAB
BH CV STRESS RECOVERY BP: NORMAL MMHG
BH CV STRESS RECOVERY HR: 88 BPM
MAXIMAL PREDICTED HEART RATE: 190 BPM
PERCENT MAX PREDICTED HR: 90 %
STRESS BASELINE BP: NORMAL MMHG
STRESS BASELINE HR: 80 BPM
STRESS PERCENT HR: 106 %
STRESS POST ESTIMATED WORKLOAD: 7 METS
STRESS POST EXERCISE DUR MIN: 5 MIN
STRESS POST EXERCISE DUR SEC: 51 SEC
STRESS POST PEAK BP: NORMAL MMHG
STRESS POST PEAK HR: 171 BPM
STRESS TARGET HR: 162 BPM

## 2019-03-18 ENCOUNTER — TELEPHONE (OUTPATIENT)
Dept: CARDIOLOGY | Facility: CLINIC | Age: 29
End: 2019-03-18

## 2019-03-18 LAB
BH CV ECHO MEAS - ACS: 1.6 CM
BH CV ECHO MEAS - AO MEAN PG: 3.6 MMHG
BH CV ECHO MEAS - AO ROOT AREA (BSA CORRECTED): 1.3
BH CV ECHO MEAS - AO ROOT AREA: 4.5 CM^2
BH CV ECHO MEAS - AO ROOT DIAM: 2.4 CM
BH CV ECHO MEAS - AO V2 MEAN: 88.7 CM/SEC
BH CV ECHO MEAS - AO V2 VTI: 23.9 CM
BH CV ECHO MEAS - BSA(HAYCOCK): 1.9 M^2
BH CV ECHO MEAS - BSA: 1.9 M^2
BH CV ECHO MEAS - BZI_BMI: 25.4 KILOGRAMS/M^2
BH CV ECHO MEAS - BZI_METRIC_HEIGHT: 172.7 CM
BH CV ECHO MEAS - BZI_METRIC_WEIGHT: 75.8 KG
BH CV ECHO MEAS - EDV(CUBED): 74 ML
BH CV ECHO MEAS - EDV(MOD-SP4): 67 ML
BH CV ECHO MEAS - EDV(TEICH): 78.5 ML
BH CV ECHO MEAS - EF(CUBED): 71.5 %
BH CV ECHO MEAS - EF(MOD-SP4): 77.6 %
BH CV ECHO MEAS - EF(TEICH): 63.6 %
BH CV ECHO MEAS - ESV(CUBED): 21.1 ML
BH CV ECHO MEAS - ESV(MOD-SP4): 15 ML
BH CV ECHO MEAS - ESV(TEICH): 28.6 ML
BH CV ECHO MEAS - FS: 34.2 %
BH CV ECHO MEAS - IVS/LVPW: 0.79
BH CV ECHO MEAS - IVSD: 0.87 CM
BH CV ECHO MEAS - LA DIMENSION: 2.7 CM
BH CV ECHO MEAS - LA/AO: 1.1
BH CV ECHO MEAS - LV DIASTOLIC VOL/BSA (35-75): 35.4 ML/M^2
BH CV ECHO MEAS - LV IVRT: 0.09 SEC
BH CV ECHO MEAS - LV MASS(C)D: 134.8 GRAMS
BH CV ECHO MEAS - LV MASS(C)DI: 71.2 GRAMS/M^2
BH CV ECHO MEAS - LV SYSTOLIC VOL/BSA (12-30): 7.9 ML/M^2
BH CV ECHO MEAS - LVIDD: 4.2 CM
BH CV ECHO MEAS - LVIDS: 2.8 CM
BH CV ECHO MEAS - LVLD AP4: 6.6 CM
BH CV ECHO MEAS - LVLS AP4: 5.1 CM
BH CV ECHO MEAS - LVOT AREA (M): 2.8 CM^2
BH CV ECHO MEAS - LVOT AREA: 3 CM^2
BH CV ECHO MEAS - LVOT DIAM: 1.9 CM
BH CV ECHO MEAS - LVPWD: 1.1 CM
BH CV ECHO MEAS - MV A MAX VEL: 49.4 CM/SEC
BH CV ECHO MEAS - MV DEC SLOPE: 281.2 CM/SEC^2
BH CV ECHO MEAS - MV E MAX VEL: 85.9 CM/SEC
BH CV ECHO MEAS - MV E/A: 1.7
BH CV ECHO MEAS - RVDD: 1.8 CM
BH CV ECHO MEAS - SI(AO): 57 ML/M^2
BH CV ECHO MEAS - SI(CUBED): 27.9 ML/M^2
BH CV ECHO MEAS - SI(MOD-SP4): 27.5 ML/M^2
BH CV ECHO MEAS - SI(TEICH): 26.3 ML/M^2
BH CV ECHO MEAS - SV(AO): 108 ML
BH CV ECHO MEAS - SV(CUBED): 52.9 ML
BH CV ECHO MEAS - SV(MOD-SP4): 52 ML
BH CV ECHO MEAS - SV(TEICH): 49.9 ML
MAXIMAL PREDICTED HEART RATE: 190 BPM
STRESS TARGET HR: 162 BPM

## 2019-03-18 NOTE — TELEPHONE ENCOUNTER
Echo:  1.  Normal study.  2.  Details as below.  Estimated EF appears to be in the range of 66 - 70%.        Stress:  1.  Below average exercise capacity without chest pain.  The patient reported shortness of breath during exercise.     2.  Exaggerated heart rate and blood pressure responses to stress suggestive of physical deconditioning or noncardiac pathology.     3.  No electrocardiographic evidence of ischemia.  No stress-induced dysrhythmia.     4.  No scintigraphic evidence of ischemia.     5.  Preserved post stress ejection fraction of 65% with no focal wall motion abnormalities.     6.  No evidence of pharmacologically-induced ischemic dilation nor of increased lung uptake of radiopharmaceutical.          Follow up on 5/9/19.

## 2019-03-18 NOTE — TELEPHONE ENCOUNTER
Pt returned my call:    Informed pt of her results and reminded her of follow up appt, she verbalized understanding.

## 2019-05-15 ENCOUNTER — TELEPHONE (OUTPATIENT)
Dept: CARDIOLOGY | Facility: CLINIC | Age: 29
End: 2019-05-15

## 2019-05-15 NOTE — TELEPHONE ENCOUNTER
LVM ASKING IF THEY NEEDED TO R/S -1988. CALLED ALL NUMBERS -[id number] HAS NO VM.    NO ONE LISTED ON HIPPA FORM TO CALL [facility], SENT NO SHOW LETTER

## 2019-08-29 ENCOUNTER — TELEPHONE (OUTPATIENT)
Dept: CARDIOLOGY | Facility: CLINIC | Age: 29
End: 2019-08-29

## 2019-11-27 ENCOUNTER — OFFICE VISIT (OUTPATIENT)
Dept: CARDIOLOGY | Facility: CLINIC | Age: 29
End: 2019-11-27

## 2019-11-27 ENCOUNTER — LAB (OUTPATIENT)
Dept: CARDIOLOGY | Facility: CLINIC | Age: 29
End: 2019-11-27

## 2019-11-27 VITALS
HEART RATE: 78 BPM | HEIGHT: 68 IN | WEIGHT: 165 LBS | SYSTOLIC BLOOD PRESSURE: 96 MMHG | OXYGEN SATURATION: 100 % | BODY MASS INDEX: 25.01 KG/M2 | DIASTOLIC BLOOD PRESSURE: 62 MMHG

## 2019-11-27 DIAGNOSIS — R06.02 SHORTNESS OF BREATH: ICD-10-CM

## 2019-11-27 DIAGNOSIS — R07.89 OTHER CHEST PAIN: Primary | ICD-10-CM

## 2019-11-27 DIAGNOSIS — R00.0 TACHYCARDIA: ICD-10-CM

## 2019-11-27 DIAGNOSIS — R07.89 OTHER CHEST PAIN: ICD-10-CM

## 2019-11-27 DIAGNOSIS — R00.2 PALPITATIONS: ICD-10-CM

## 2019-11-27 PROCEDURE — 99213 OFFICE O/P EST LOW 20 MIN: CPT | Performed by: NURSE PRACTITIONER

## 2019-11-27 PROCEDURE — 86677 HELICOBACTER PYLORI ANTIBODY: CPT | Performed by: NURSE PRACTITIONER

## 2019-11-27 RX ORDER — ACEBUTOLOL HYDROCHLORIDE 200 MG/1
1 CAPSULE ORAL 2 TIMES DAILY
COMMUNITY
Start: 2019-11-17 | End: 2022-07-19 | Stop reason: ALTCHOICE

## 2019-11-27 NOTE — PROGRESS NOTES
Subjective   Nesha Aviles is a 29 y.o. female     Chief Complaint   Patient presents with   • Follow-up     Here for a follow up        HPI    Problem List:     1. Palpitations/tachycardia  1.1 event monitor 4/10-4/16/17-sinus rhythm, PACs, sinus tach   2. Shortness of breath   2.1 Echo 9/6/17-EF 55-60%, trace MR, physiological TR, PA 15-20  3. Chest Pain  3.1 stress test 3/13/2019-no evidence of ischemia, post-stress EF 65%    Patient is a 29-year-old female who presents today for follow-up due to continued chest pain.  Patient says she will have chest pain almost daily which typically is in the middle of her chest.  She says sometimes it is dull and sometimes it sharp.  She says it can be fleeting.  She says it generally is in her mid sternum and her epigastric region.  She does not wonder if it is her stomach that is causing other issues but because she has panic disorder and makes her worried that she is going to have a heart attack and die.  She says she will be awoken with it as well.  He denies any other symptoms with it.  She has just an occasional palpitation.  She denies any dizziness, presyncope, syncope, PND or edema.  She does get lightheaded when she stands too fast but she is been put on his pillow which made her blood pressure go lower and that is probably the cause.  She says that she has some recurrences where she will wake up smothering but she thinks again it is more related to when she has this discomfort in her chest and panics.  She says that the only time she notices shortness of breath otherwise she is fine.    Current Outpatient Medications on File Prior to Visit   Medication Sig Dispense Refill   • acebutolol (SECTRAL) 200 MG capsule Take 1 capsule by mouth 2 (Two) Times a Day.     • aspirin 81 MG tablet Take 1 tablet by mouth Daily. 30 tablet 11     No current facility-administered medications on file prior to visit.        ALLERGIES    Patient has no known allergies.    Past Medical  History:   Diagnosis Date   • Arrhythmia    • Hx of iron deficiency anemia    • Hypothyroidism     when younger       Social History     Socioeconomic History   • Marital status:      Spouse name: Not on file   • Number of children: Not on file   • Years of education: Not on file   • Highest education level: Not on file   Tobacco Use   • Smoking status: Never Smoker   • Smokeless tobacco: Never Used   Substance and Sexual Activity   • Alcohol use: No   • Drug use: No       Family History   Problem Relation Age of Onset   • Hypertension Mother    • No Known Problems Father        Review of Systems   Constitutional: Negative for chills, fatigue and fever.   HENT: Negative for congestion, rhinorrhea and sore throat.    Eyes: Positive for visual disturbance (glasses daily ).   Respiratory: Positive for shortness of breath (with the chest discomfort, especially if it is out of the blue ). Negative for chest tightness.    Cardiovascular: Positive for chest pain (having chest pain daily. Sometimes sharp pains sometimes dull aches. Usually just fleeting; midsternu, can occur at anytime, will wake her up, noticeable pain; also has panic disorder; always same region including epigastric region ) and palpitations (Occasional flutters ). Negative for leg swelling.   Gastrointestinal: Negative for abdominal pain, blood in stool, nausea and vomiting.   Endocrine: Negative for cold intolerance and heat intolerance.   Genitourinary: Negative for dysuria, frequency, hematuria and urgency.   Musculoskeletal: Negative for arthralgias and back pain.   Skin: Negative for rash and wound.   Allergic/Immunologic: Negative for environmental allergies and food allergies.   Neurological: Positive for light-headedness (when standing too fast ). Negative for dizziness and weakness.   Hematological: Does not bruise/bleed easily.   Psychiatric/Behavioral: Positive for sleep disturbance (Has been waking with smothering fairly often ).  "      Objective   BP 96/62 (BP Location: Left arm, Patient Position: Sitting)   Pulse 78   Ht 172.7 cm (68\")   Wt 74.8 kg (165 lb)   SpO2 100%   BMI 25.09 kg/m²    Vitals:    11/27/19 0916   BP: 96/62   BP Location: Left arm   Patient Position: Sitting   Pulse: 78   SpO2: 100%   Weight: 74.8 kg (165 lb)   Height: 172.7 cm (68\")      Lab Results (most recent)     None        Physical Exam   Constitutional: She is oriented to person, place, and time. Vital signs are normal. She appears well-developed and well-nourished. She is active and cooperative.   HENT:   Head: Normocephalic.   Eyes: Lids are normal.   Wears glasses    Neck: Normal carotid pulses, no hepatojugular reflux and no JVD present. Carotid bruit is not present.   Cardiovascular: Normal rate, regular rhythm and normal heart sounds.   Pulses:       Radial pulses are 2+ on the right side, and 2+ on the left side.        Dorsalis pedis pulses are 2+ on the right side, and 2+ on the left side.        Posterior tibial pulses are 2+ on the right side, and 2+ on the left side.   No edema BLE.   Pulmonary/Chest: Effort normal and breath sounds normal.   Abdominal: Normal appearance and bowel sounds are normal.   Neurological: She is alert and oriented to person, place, and time.   Skin: Skin is warm, dry and intact.   Psychiatric: She has a normal mood and affect. Her speech is normal and behavior is normal. Judgment and thought content normal. Cognition and memory are normal.       Procedure   Procedures         Assessment/Plan      Diagnosis Plan   1. Other chest pain  Helicobacter Pylori, IgA IgG IgM   2. Palpitations     3. Tachycardia     4. Shortness of breath         Return in about 14 weeks (around 3/4/2020).    Chest pain-patient will have H. pylori test today.  Palpitations/tachycardia-stable and is below.  Shortness of breath-stable.  Patient will continue her medication regimen.  She will follow-up in 14 weeks or sooner if any changes.  If " patient's symptoms persist and she has a negative H. pylori when she follows up we will consider a CT of the heart.         Patient's Body mass index is 25.09 kg/m². BMI is within normal parameters. No follow-up required..      Electronically signed by:

## 2019-11-27 NOTE — PATIENT INSTRUCTIONS
Nonspecific Chest Pain  Chest pain can be caused by many different conditions. Some causes of chest pain can be life-threatening. These will require treatment right away. Serious causes of chest pain include:  · Heart attack.  · A tear in the body's main blood vessel.  · Redness and swelling (inflammation) around your heart.  · Blood clot in your lungs.  Other causes of chest pain may not be so serious. These include:  · Heartburn.  · Anxiety or stress.  · Damage to bones or muscles in your chest.  · Lung infections.  Chest pain can feel like:  · Pain or discomfort in your chest.  · Crushing, pressure, aching, or squeezing pain.  · Burning or tingling.  · Dull or sharp pain that is worse when you move, cough, or take a deep breath.  · Pain or discomfort that is also felt in your back, neck, jaw, shoulder, or arm, or pain that spreads to any of these areas.  It is hard to know whether your pain is caused by something that is serious or something that is not so serious. So it is important to see your doctor right away if you have chest pain.  Follow these instructions at home:  Medicines  · Take over-the-counter and prescription medicines only as told by your doctor.  · If you were prescribed an antibiotic medicine, take it as told by your doctor. Do not stop taking the antibiotic even if you start to feel better.  Lifestyle    · Rest as told by your doctor.  · Do not use any products that contain nicotine or tobacco, such as cigarettes, e-cigarettes, and chewing tobacco. If you need help quitting, ask your doctor.  · Do not drink alcohol.  · Make lifestyle changes as told by your doctor. These may include:  ? Getting regular exercise. Ask your doctor what activities are safe for you.  ? Eating a heart-healthy diet. A diet and nutrition specialist (dietitian) can help you to learn healthy eating options.  ? Staying at a healthy weight.  ? Treating diabetes or high blood pressure, if needed.  ? Lowering your stress.  Activities such as yoga and relaxation techniques can help.  General instructions  · Pay attention to any changes in your symptoms. Tell your doctor about them or any new symptoms.  · Avoid any activities that cause chest pain.  · Keep all follow-up visits as told by your doctor. This is important. You may need more testing if your chest pain does not go away.  Contact a doctor if:  · Your chest pain does not go away.  · You feel depressed.  · You have a fever.  Get help right away if:  · Your chest pain is worse.  · You have a cough that gets worse, or you cough up blood.  · You have very bad (severe) pain in your belly (abdomen).  · You pass out (faint).  · You have either of these for no clear reason:  ? Sudden chest discomfort.  ? Sudden discomfort in your arms, back, neck, or jaw.  · You have shortness of breath at any time.  · You suddenly start to sweat, or your skin gets clammy.  · You feel sick to your stomach (nauseous).  · You throw up (vomit).  · You suddenly feel lightheaded or dizzy.  · You feel very weak or tired.  · Your heart starts to beat fast, or it feels like it is skipping beats.  These symptoms may be an emergency. Do not wait to see if the symptoms will go away. Get medical help right away. Call your local emergency services (911 in the [place] Do not drive yourself to the hospital.  Summary  · Chest pain can be caused by many different conditions. The cause may be serious and need treatment right away. If you have chest pain, see your doctor right away.  · Follow your doctor's instructions for taking medicines and making lifestyle changes.  · Keep all follow-up visits as told by your doctor. This includes visits for any further testing if your chest pain does not go away.  · Be sure to know the signs that show that your condition has become worse. Get help right away if you have these symptoms.  This information is not intended to replace advice given to you by your health care provider. Make  sure you discuss any questions you have with your health care provider.  Document Released: 06/05/2009 Document Revised: 06/20/2019 Document Reviewed: 06/20/2019  Elsevier Interactive Patient Education © 2019 [employer]

## 2019-11-30 LAB
H PYLORI IGA SER IA-ACNC: <9 UNITS (ref 0–8.9)
H PYLORI IGG SER IA-ACNC: 0.29 INDEX VALUE (ref 0–0.79)
H PYLORI IGM SER-ACNC: <9 UNITS (ref 0–8.9)

## 2019-12-02 ENCOUNTER — TELEPHONE (OUTPATIENT)
Dept: CARDIOLOGY | Facility: CLINIC | Age: 29
End: 2019-12-02

## 2019-12-02 NOTE — TELEPHONE ENCOUNTER
H. pylori IgG  0.00 - 0.79 Index Value 0.29    Comment:                              Negative           <0.80                                Equivocal    0.80 - 0.89                                Positive           >0.89   H. pylori, IgA ABS  0.0 - 8.9 units <9.0    Comment:                                 Negative          <9.0                                   Equivocal   9.0 - 11.0                                   Positive         >11.0   H. Pylori, IgM  0.0 - 8.9 units <9.0    Comment:                                 Negative          <9.0                                   Equivocal   9.0 - 11.0                                   Positive         >11.0   This test was developed and its performance characteristics   determined by [facility]. It has not been cleared or approved        First attempt to reach pt. Left a voicemail for pt to return my call at [phone].

## 2020-02-25 ENCOUNTER — TELEPHONE (OUTPATIENT)
Dept: CARDIOLOGY | Facility: CLINIC | Age: 30
End: 2020-02-25

## 2020-02-25 DIAGNOSIS — R07.2 PRECORDIAL PAIN: Primary | ICD-10-CM

## 2020-02-25 RX ORDER — NITROGLYCERIN 0.4 MG/1
TABLET SUBLINGUAL
Qty: 25 TABLET | Refills: 3 | Status: SHIPPED | OUTPATIENT
Start: 2020-02-25 | End: 2023-01-18 | Stop reason: SDUPTHER

## 2020-02-25 NOTE — TELEPHONE ENCOUNTER
"Patient called office stating she started having \"chst discomfort\" over her (L) breast bone today. She is not having any SOA or palpitations, flutters. This comes and goes however has increased in frequency. Patient was notified firstly if having CP she needs to go to local ER immediatly, again she denied. notified patient that ABEL Solorio was not in office today. Patient stated she did not think this was bad enough o go to ER.   Called provider ABEL Solorio via cell phone, notified her of patient symptoms, verbal orders given that we can order labs ( D-Dimer, Troponin, and CkMB) patient will need to got to [facility] lab and have these drawn, if elevated we will address immediatly, if normal we can order updated stress and echo testing. Also notified to send in Nitro 0.4 mg for patient as well.  Notified patient of all verbal orders, patient was notified to go to [facility] and have these labs drawn, patient was notified that Nitro will be sent into pharmacy and how to take as well, patient was instructed in abnormal we will call once received, if normal we will order stress and echo. Patient verbalized understanding and had no further questions at this time. Patient was also instructed again to proceed to ER immediatly if pain worsens or persist. -RANDY;ARELIS  "

## 2020-03-25 ENCOUNTER — RESULTS ENCOUNTER (OUTPATIENT)
Dept: CARDIOLOGY | Facility: CLINIC | Age: 30
End: 2020-03-25

## 2020-03-25 DIAGNOSIS — R07.2 PRECORDIAL PAIN: ICD-10-CM

## 2020-03-26 ENCOUNTER — RESULTS ENCOUNTER (OUTPATIENT)
Dept: CARDIOLOGY | Facility: CLINIC | Age: 30
End: 2020-03-26

## 2020-03-26 DIAGNOSIS — R07.2 PRECORDIAL PAIN: ICD-10-CM

## 2022-07-19 ENCOUNTER — TELEPHONE (OUTPATIENT)
Dept: CARDIOLOGY | Facility: CLINIC | Age: 32
End: 2022-07-19

## 2022-07-19 ENCOUNTER — LAB (OUTPATIENT)
Dept: CARDIOLOGY | Facility: CLINIC | Age: 32
End: 2022-07-19

## 2022-07-19 ENCOUNTER — OFFICE VISIT (OUTPATIENT)
Dept: CARDIOLOGY | Facility: CLINIC | Age: 32
End: 2022-07-19

## 2022-07-19 VITALS
HEIGHT: 68 IN | DIASTOLIC BLOOD PRESSURE: 73 MMHG | SYSTOLIC BLOOD PRESSURE: 105 MMHG | TEMPERATURE: 98.1 F | HEART RATE: 87 BPM | OXYGEN SATURATION: 97 % | BODY MASS INDEX: 26.52 KG/M2 | WEIGHT: 175 LBS

## 2022-07-19 DIAGNOSIS — R00.2 PALPITATIONS: ICD-10-CM

## 2022-07-19 DIAGNOSIS — Z00.00 HEALTHCARE MAINTENANCE: ICD-10-CM

## 2022-07-19 DIAGNOSIS — R53.81 MALAISE AND FATIGUE: ICD-10-CM

## 2022-07-19 DIAGNOSIS — Z86.16 HISTORY OF COVID-19: ICD-10-CM

## 2022-07-19 DIAGNOSIS — R00.2 PALPITATIONS: Primary | ICD-10-CM

## 2022-07-19 DIAGNOSIS — R53.83 MALAISE AND FATIGUE: ICD-10-CM

## 2022-07-19 DIAGNOSIS — I49.1 PREMATURE ATRIAL COMPLEXES: ICD-10-CM

## 2022-07-19 DIAGNOSIS — R42 DIZZINESS: ICD-10-CM

## 2022-07-19 DIAGNOSIS — R00.0 TACHYCARDIA: ICD-10-CM

## 2022-07-19 DIAGNOSIS — R74.8 ELEVATED LIVER ENZYMES: Primary | ICD-10-CM

## 2022-07-19 DIAGNOSIS — R06.02 SHORTNESS OF BREATH: ICD-10-CM

## 2022-07-19 LAB
ALBUMIN SERPL-MCNC: 4.67 G/DL (ref 3.5–5.2)
ALBUMIN/GLOB SERPL: 1.8 G/DL
ALP SERPL-CCNC: 69 U/L (ref 39–117)
ALT SERPL W P-5'-P-CCNC: 30 U/L (ref 1–33)
ANION GAP SERPL CALCULATED.3IONS-SCNC: 13.2 MMOL/L (ref 5–15)
AST SERPL-CCNC: 34 U/L (ref 1–32)
BASOPHILS # BLD AUTO: 0.07 10*3/MM3 (ref 0–0.2)
BASOPHILS NFR BLD AUTO: 1 % (ref 0–1.5)
BILIRUB SERPL-MCNC: 1.4 MG/DL (ref 0–1.2)
BUN SERPL-MCNC: 9 MG/DL (ref 6–20)
BUN/CREAT SERPL: 13.4 (ref 7–25)
CALCIUM SPEC-SCNC: 9.6 MG/DL (ref 8.6–10.5)
CHLORIDE SERPL-SCNC: 101 MMOL/L (ref 98–107)
CHOLEST SERPL-MCNC: 201 MG/DL (ref 0–200)
CO2 SERPL-SCNC: 25.8 MMOL/L (ref 22–29)
CREAT SERPL-MCNC: 0.67 MG/DL (ref 0.57–1)
DEPRECATED RDW RBC AUTO: 40.4 FL (ref 37–54)
EGFRCR SERPLBLD CKD-EPI 2021: 117.8 ML/MIN/1.73
EOSINOPHIL # BLD AUTO: 0.28 10*3/MM3 (ref 0–0.4)
EOSINOPHIL NFR BLD AUTO: 3.9 % (ref 0.3–6.2)
ERYTHROCYTE [DISTWIDTH] IN BLOOD BY AUTOMATED COUNT: 12.6 % (ref 12.3–15.4)
GLOBULIN UR ELPH-MCNC: 2.5 GM/DL
GLUCOSE SERPL-MCNC: 85 MG/DL (ref 65–99)
HCT VFR BLD AUTO: 42.9 % (ref 34–46.6)
HDLC SERPL-MCNC: 61 MG/DL (ref 40–60)
HGB BLD-MCNC: 14.2 G/DL (ref 12–15.9)
IMM GRANULOCYTES # BLD AUTO: 0.04 10*3/MM3 (ref 0–0.05)
IMM GRANULOCYTES NFR BLD AUTO: 0.6 % (ref 0–0.5)
LDLC SERPL CALC-MCNC: 126 MG/DL (ref 0–100)
LDLC/HDLC SERPL: 2.04 {RATIO}
LYMPHOCYTES # BLD AUTO: 2.62 10*3/MM3 (ref 0.7–3.1)
LYMPHOCYTES NFR BLD AUTO: 36.1 % (ref 19.6–45.3)
MAGNESIUM SERPL-MCNC: 1.8 MG/DL (ref 1.6–2.6)
MCH RBC QN AUTO: 28.9 PG (ref 26.6–33)
MCHC RBC AUTO-ENTMCNC: 33.1 G/DL (ref 31.5–35.7)
MCV RBC AUTO: 87.4 FL (ref 79–97)
MONOCYTES # BLD AUTO: 0.64 10*3/MM3 (ref 0.1–0.9)
MONOCYTES NFR BLD AUTO: 8.8 % (ref 5–12)
NEUTROPHILS NFR BLD AUTO: 3.6 10*3/MM3 (ref 1.7–7)
NEUTROPHILS NFR BLD AUTO: 49.6 % (ref 42.7–76)
NRBC BLD AUTO-RTO: 0 /100 WBC (ref 0–0.2)
PLATELET # BLD AUTO: 372 10*3/MM3 (ref 140–450)
PMV BLD AUTO: 10.5 FL (ref 6–12)
POTASSIUM SERPL-SCNC: 3.9 MMOL/L (ref 3.5–5.2)
PROT SERPL-MCNC: 7.2 G/DL (ref 6–8.5)
RBC # BLD AUTO: 4.91 10*6/MM3 (ref 3.77–5.28)
SODIUM SERPL-SCNC: 140 MMOL/L (ref 136–145)
T4 FREE SERPL-MCNC: 1.27 NG/DL (ref 0.93–1.7)
TRIGL SERPL-MCNC: 78 MG/DL (ref 0–150)
TSH SERPL DL<=0.05 MIU/L-ACNC: 2.49 UIU/ML (ref 0.27–4.2)
VLDLC SERPL-MCNC: 14 MG/DL (ref 5–40)
WBC NRBC COR # BLD: 7.25 10*3/MM3 (ref 3.4–10.8)

## 2022-07-19 PROCEDURE — 82652 VIT D 1 25-DIHYDROXY: CPT | Performed by: NURSE PRACTITIONER

## 2022-07-19 PROCEDURE — 84481 FREE ASSAY (FT-3): CPT | Performed by: NURSE PRACTITIONER

## 2022-07-19 PROCEDURE — 93000 ELECTROCARDIOGRAM COMPLETE: CPT | Performed by: NURSE PRACTITIONER

## 2022-07-19 PROCEDURE — 82607 VITAMIN B-12: CPT | Performed by: NURSE PRACTITIONER

## 2022-07-19 PROCEDURE — 36415 COLL VENOUS BLD VENIPUNCTURE: CPT

## 2022-07-19 PROCEDURE — 83735 ASSAY OF MAGNESIUM: CPT | Performed by: NURSE PRACTITIONER

## 2022-07-19 PROCEDURE — 84439 ASSAY OF FREE THYROXINE: CPT | Performed by: NURSE PRACTITIONER

## 2022-07-19 PROCEDURE — 99213 OFFICE O/P EST LOW 20 MIN: CPT | Performed by: NURSE PRACTITIONER

## 2022-07-19 PROCEDURE — 80053 COMPREHEN METABOLIC PANEL: CPT | Performed by: NURSE PRACTITIONER

## 2022-07-19 PROCEDURE — 84443 ASSAY THYROID STIM HORMONE: CPT | Performed by: NURSE PRACTITIONER

## 2022-07-19 PROCEDURE — 80061 LIPID PANEL: CPT | Performed by: NURSE PRACTITIONER

## 2022-07-19 PROCEDURE — 85025 COMPLETE CBC W/AUTO DIFF WBC: CPT | Performed by: NURSE PRACTITIONER

## 2022-07-19 RX ORDER — PROPRANOLOL HYDROCHLORIDE 10 MG/1
10 TABLET ORAL 2 TIMES DAILY
COMMUNITY

## 2022-07-19 NOTE — TELEPHONE ENCOUNTER
----- Message from ABEL Guillen sent at 7/19/2022  3:33 PM EDT -----  Please advise patient her thyroid and free T4 within normal range.  Her creatinine and potassium are within normal range.  She has 1 liver enzyme that is elevated by 2 points.  Just to make sure she is avoid Tylenol and Tylenol-containing products as well as alcohol.  Repeat a CMP in 2 weeks.  Her LDL is elevated at 126.  She just needs to watch her red meat and fried foods.  Magnesium is within normal range.  Hemoglobin, hematocrit as well as platelets are normal as well.  For the labs received thus far to PCP.        Pt was advised of the above mess/labs she states that she does not drink or take Tylenol products she will repeat labs here in 2 weeks ( CMP ) ordered and she was advised to watch her red meats and fried foods due to elevated LDL pending labs ( normal ) received 07/21/2022       All other labs ( Normal)   AST (SGOT)   1 - 32 U/L 34 High      LDL Cholesterol    0 - 100 mg/dL 126 High         JONH Escudero

## 2022-07-19 NOTE — PROGRESS NOTES
Subjective   Nesha Aviles is a 32 y.o. female     Chief Complaint   Patient presents with   • Follow-up       HPI    Problem List:     1. Palpitations/tachycardia  1.1 event monitor 4/10-4/16/17-sinus rhythm, PACs, sinus tach   2. Shortness of breath   2.1 Echo 9/6/17-EF 55-60%, trace MR, physiological TR, PA 15-20  3. Chest Pain  3.1 stress test 3/13/2019-no evidence of ischemia, post-stress EF 65%  4. History of COVID-19 11/2020    Patient is a 32-year-old female who presents today for follow-up.  She denies any chest pain or pressure.  She has palpitation over her a flutter, race and pound.  She says it just happens randomly.  She does notice it when she leans forward or if she is bending over or if she is laying on her side left or right.  She says it is more prominent then.  Patient says she does have some dizziness and lightheadedness when she changes position or moves quickly.  She denies any presyncope, syncope, orthopnea, PND or edema.  Patient says she maybe has some shortness of breath whenever she has her palpitations but she is unsure if it is that if it is anxiety.  She is fatigued all the time.  Patient have COVID back in 2020 and she had a bad headache and fatigue.  Patient is getting ready to go out of town for work for almost a week.  She will be traveling all over the [place] area with her children.    Current Outpatient Medications on File Prior to Visit   Medication Sig Dispense Refill   • nitroglycerin (NITROSTAT) 0.4 MG SL tablet 1 under the tongue as needed for angina, may repeat q5mins for up three doses 25 tablet 3   • propranolol (INDERAL) 10 MG tablet Take 10 mg by mouth 2 (Two) Times a Day.     • [DISCONTINUED] acebutolol (SECTRAL) 200 MG capsule Take 1 capsule by mouth 2 (Two) Times a Day.     • [DISCONTINUED] aspirin 81 MG tablet Take 1 tablet by mouth Daily. 30 tablet 11     No current facility-administered medications on file prior to visit.       ALLERGIES    Patient has no  known allergies.    Past Medical History:   Diagnosis Date   • Arrhythmia    • COVID-19     11/2020   • COVID-19 vaccine administered     03/10/2021, 05/04/2021 - Moderna   • Hx of iron deficiency anemia    • Hypothyroidism     when younger       Social History     Socioeconomic History   • Marital status:    Tobacco Use   • Smoking status: Never Smoker   • Smokeless tobacco: Never Used   Substance and Sexual Activity   • Alcohol use: No   • Drug use: No       Family History   Problem Relation Age of Onset   • Hypertension Mother    • No Known Problems Father        Review of Systems   Constitutional: Positive for fatigue (fatigued; all of the time. ). Negative for appetite change, chills, diaphoresis and fever.   HENT: Negative for congestion, rhinorrhea and sore throat.    Eyes: Positive for visual disturbance (corrective lens ).   Respiratory: Positive for shortness of breath (not sure if she get short of breath because she gets anxious with palps or if palps make short of breath ). Negative for cough, chest tightness and wheezing.    Cardiovascular: Positive for palpitations (fluttering , races and pounds it will just happen at random, notices them in certain positions, bending over will notice them and when she lays on the side; they make her very anxious; skips a beat ). Negative for chest pain and leg swelling.   Gastrointestinal: Negative for abdominal pain, blood in stool, constipation, diarrhea, nausea and vomiting.   Endocrine: Negative for cold intolerance and heat intolerance.   Genitourinary: Negative for difficulty urinating, dysuria, frequency, hematuria and urgency.   Musculoskeletal: Negative for arthralgias, back pain, joint swelling, neck pain and neck stiffness.   Skin: Negative for color change, pallor, rash and wound.   Allergic/Immunologic: Negative for environmental allergies and food allergies.   Neurological: Positive for dizziness (when she stands up to quick or it will just happen  "at times ), light-headedness (goes along with the dizziness ) and headaches (H/O of H/A ). Negative for weakness and numbness.   Hematological: Does not bruise/bleed easily.   Psychiatric/Behavioral: Positive for sleep disturbance (hard to go and hard to stay asleep ).       Objective   /73 (BP Location: Right arm, Patient Position: Sitting)   Pulse 87   Temp 98.1 °F (36.7 °C)   Ht 172.7 cm (68\")   Wt 79.4 kg (175 lb)   SpO2 97%   BMI 26.61 kg/m²   Vitals:    07/19/22 1015   BP: 105/73   BP Location: Right arm   Patient Position: Sitting   Pulse: 87   Temp: 98.1 °F (36.7 °C)   SpO2: 97%   Weight: 79.4 kg (175 lb)   Height: 172.7 cm (68\")      Lab Results (most recent)     None        Physical Exam  Vitals reviewed.   Constitutional:       General: She is awake.      Appearance: Normal appearance. She is well-developed, well-groomed and overweight.   HENT:      Head: Normocephalic.   Eyes:      General: Lids are normal.   Neck:      Vascular: No carotid bruit, hepatojugular reflux or JVD.   Cardiovascular:      Rate and Rhythm: Normal rate and regular rhythm.      Pulses:           Radial pulses are 2+ on the right side and 2+ on the left side.        Dorsalis pedis pulses are 2+ on the right side and 2+ on the left side.        Posterior tibial pulses are 2+ on the right side and 2+ on the left side.      Heart sounds: Normal heart sounds.   Pulmonary:      Effort: Pulmonary effort is normal.      Breath sounds: Normal breath sounds and air entry.   Abdominal:      General: Bowel sounds are normal.      Palpations: Abdomen is soft.   Musculoskeletal:      Right lower leg: No edema.      Left lower leg: No edema.   Skin:     General: Skin is warm and dry.   Neurological:      Mental Status: She is alert and oriented to person, place, and time.   Psychiatric:         Attention and Perception: Attention and perception normal.         Mood and Affect: Mood and affect normal.         Speech: Speech normal.    "      Behavior: Behavior normal. Behavior is cooperative.         Thought Content: Thought content normal.         Cognition and Memory: Cognition and memory normal.         Judgment: Judgment normal.         Procedure     ECG 12 Lead    Date/Time: 7/19/2022 10:25 AM  Performed by: Shoshana Casey APRN  Authorized by: Shoshana Casey APRN   Comparison: compared with previous ECG from 2/14/2019  Comparison to previous ECG: Previously normal sinus rhythm with sinus arrhythmia and nonspecific T wave abnormality  Rhythm: sinus rhythm  Rate: normal  BPM: 82  QRS axis: normal  Other findings: low voltage    Clinical impression: non-specific ECG                 Assessment & Plan      Diagnosis Plan   1. Palpitations  ECG 12 Lead    Comprehensive Metabolic Panel    TSH    T3, Free    T4, Free    Magnesium    Holter Monitor - 48 Hour    Treadmill Stress Test    Adult Transthoracic Echo Complete W/ Cont if Necessary Per Protocol   2. Tachycardia  ECG 12 Lead    Holter Monitor - 48 Hour    Treadmill Stress Test    Adult Transthoracic Echo Complete W/ Cont if Necessary Per Protocol   3. Shortness of breath  Treadmill Stress Test    Adult Transthoracic Echo Complete W/ Cont if Necessary Per Protocol   4. Dizziness  Duplex Carotid Ultrasound CAR   5. Healthcare maintenance  CBC & Differential    Lipid Panel   6. History of COVID-19  Treadmill Stress Test    Adult Transthoracic Echo Complete W/ Cont if Necessary Per Protocol   7. Malaise and fatigue  Vitamin D 1,25 Dihydroxy    Vitamin B12    Treadmill Stress Test   8. Premature atrial complexes  Holter Monitor - 48 Hour    Treadmill Stress Test    Adult Transthoracic Echo Complete W/ Cont if Necessary Per Protocol       Return in about 12 weeks (around 10/11/2022).    Palpitations/tachycardia/shortness of breath/history of COVID-19/fatigue/PVCs-patient will have an ischemia work-up, stress and echocardiogram.  She will wear a Holter monitor for 48 hours.  Dizziness-she will  carotid artery ultrasound.  Malaise and fatigue-patient will get a vitamin D and vitamin B12 liver.  She is also getting a TSH, free T3, free T4, magnesium, CMP, CBC and lipid.  She will continue her medication regimen for now.  She will follow-up in 12 weeks or sooner if any changes or abnormalities with testing.       Nesha Aviles  reports that she has never smoked. She has never used smokeless tobacco..Patient did not bring med list or medicine bottles to appointment, med list has been reviewed and updated based on patient's knowledge of their meds.   Electronically signed by:

## 2022-07-20 LAB
T3FREE SERPL-MCNC: 3.2 PG/ML (ref 2–4.4)
VIT B12 BLD-MCNC: 805 PG/ML (ref 211–946)

## 2022-07-21 LAB — 1,25(OH)2D SERPL-MCNC: 32.7 PG/ML (ref 24.8–81.5)

## 2022-08-16 ENCOUNTER — TELEPHONE (OUTPATIENT)
Dept: CARDIOLOGY | Facility: CLINIC | Age: 32
End: 2022-08-16

## 2022-12-26 ENCOUNTER — TELEPHONE (OUTPATIENT)
Dept: CARDIOLOGY | Facility: CLINIC | Age: 32
End: 2022-12-26

## 2022-12-26 NOTE — TELEPHONE ENCOUNTER
Called and advised pt that we had not received her repeat CMP from 07/2022 she will repeat labs 01/18/2023 when she has her f/up apt .    JONH Escudero

## 2023-01-18 ENCOUNTER — OFFICE VISIT (OUTPATIENT)
Dept: CARDIOLOGY | Facility: CLINIC | Age: 33
End: 2023-01-18
Payer: COMMERCIAL

## 2023-01-18 VITALS
HEART RATE: 80 BPM | TEMPERATURE: 97.2 F | WEIGHT: 172 LBS | HEIGHT: 68 IN | OXYGEN SATURATION: 100 % | SYSTOLIC BLOOD PRESSURE: 106 MMHG | DIASTOLIC BLOOD PRESSURE: 72 MMHG | BODY MASS INDEX: 26.07 KG/M2

## 2023-01-18 DIAGNOSIS — R06.02 SHORTNESS OF BREATH: ICD-10-CM

## 2023-01-18 DIAGNOSIS — R42 DIZZINESS: ICD-10-CM

## 2023-01-18 DIAGNOSIS — R00.2 PALPITATIONS: ICD-10-CM

## 2023-01-18 DIAGNOSIS — R07.89 OTHER CHEST PAIN: Primary | ICD-10-CM

## 2023-01-18 DIAGNOSIS — Z86.16 HISTORY OF COVID-19: ICD-10-CM

## 2023-01-18 PROCEDURE — 99214 OFFICE O/P EST MOD 30 MIN: CPT | Performed by: NURSE PRACTITIONER

## 2023-01-18 RX ORDER — NITROGLYCERIN 0.4 MG/1
TABLET SUBLINGUAL
Qty: 45 TABLET | Refills: 2 | Status: SHIPPED | OUTPATIENT
Start: 2023-01-18

## 2023-01-18 NOTE — LETTER
January 18, 2023     Jesus Bee MD  [address] [facility] [address]    Patient: Nesha Aviles   YOB: 1990   Date of Visit: 1/18/2023       Dear Dr. Cristal MD:    Thank you for referring Nesha Aviles to me for evaluation. Below are the relevant portions of my assessment and plan of care.    If you have questions, please do not hesitate to call me. I look forward to following Nesha along with you.         Sincerely,        ABEL Palacios        CC: No Recipients  Shoshana Sinha, ABEL  01/18/23 1042  Signed  Subjective    Nesha Aviles is a 32 y.o. female     Chief Complaint   Patient presents with   • Follow-up       HPI    Problem List:     1. Palpitations/tachycardia  1.1 event monitor 4/10-4/16/17-sinus rhythm, PACs, sinus tach   1.2 Holter 7/19-7/21/2022 - No ectopy   2. Shortness of breath   2.1 Echo 9/6/17-EF 55-60%, trace MR, physiological TR, PA 15-20  3. Chest Pain  3.1 stress test 3/13/2019-no evidence of ischemia, post-stress EF 65%  4. History of COVID-19 11/2020    Patient is a 32-year-old female who presents today for follow-up on her monitor.  She states that she gets this vice  feeling around her chest.  She says she is unsure if it is anxiety or not because she says she has chronic anxiety.  She does still have palpitations where she feels like her heart will race and pound.  She says she has them at least once a day but she will usually have one and then it stops.  She says it is always when she is doing the same thing such as bending over.  Patient does have some dizziness when she stands up too quickly.  She denies any presyncope, syncope, orthopnea, PND or edema.  She says she feels like she just cannot get a good breath and again she relates this to her anxiety.  She was previously supposed to have an ischemia work-up but she did not states she would like to get that rescheduled.    We went over her Holter.  Also her labs her LDL was 126, triglycerides  78, creatinine 0.67 and K was 3.9.      Current Outpatient Medications on File Prior to Visit   Medication Sig Dispense Refill   • propranolol (INDERAL) 10 MG tablet Take 10 mg by mouth 2 (Two) Times a Day.     • [DISCONTINUED] nitroglycerin (NITROSTAT) 0.4 MG SL tablet 1 under the tongue as needed for angina, may repeat q5mins for up three doses 25 tablet 3     No current facility-administered medications on file prior to visit.       ALLERGIES    Patient has no known allergies.    Past Medical History:   Diagnosis Date   • Arrhythmia    • COVID-19     11/2020   • COVID-19 vaccine administered     03/10/2021, 05/04/2021 - Moderna   • Hx of iron deficiency anemia    • Hypothyroidism     when younger       Social History     Socioeconomic History   • Marital status:    Tobacco Use   • Smoking status: Never   • Smokeless tobacco: Never   Vaping Use   • Vaping Use: Never used   Substance and Sexual Activity   • Alcohol use: No   • Drug use: No       Family History   Problem Relation Age of Onset   • Hypertension Mother    • No Known Problems Father        Review of Systems   Constitutional: Negative for appetite change, chills, diaphoresis, fatigue and fever.   HENT: Negative for congestion, rhinorrhea and sore throat.    Eyes: Positive for visual disturbance (glasses and corrective lens ).   Respiratory: Positive for shortness of breath (due to anxiety , she feels like she cant get a good breath in ). Negative for cough, chest tightness and wheezing.    Cardiovascular: Positive for chest pain (vice  feeling around chest, unsure if anxiety or not) and palpitations (races, fluttering and pounds; has them fairly often, once a day, usually one beat, usually when bends over with same position ). Negative for leg swelling.   Gastrointestinal: Negative for abdominal pain, blood in stool, constipation, diarrhea, nausea and vomiting.   Endocrine: Negative for cold intolerance and heat intolerance.   Genitourinary:  "Negative for difficulty urinating, dysuria, frequency, hematuria and urgency.   Musculoskeletal: Negative for arthralgias, back pain, joint swelling, neck pain and neck stiffness.   Skin: Negative for color change, pallor, rash and wound.   Allergic/Immunologic: Negative for environmental allergies and food allergies.   Neurological: Positive for dizziness (due to her BP being low and when she stands up to quick ). Negative for syncope, weakness, light-headedness, numbness and headaches.   Hematological: Bruises/bleeds easily (BRusies and bleeds easy ).   Psychiatric/Behavioral: Negative for sleep disturbance. The patient is nervous/anxious (patient says she is chronically anxious ).        Objective    /72 (BP Location: Left arm, Patient Position: Sitting)   Pulse 80   Temp 97.2 °F (36.2 °C)   Ht 172.7 cm (68\")   Wt 78 kg (172 lb)   SpO2 100%   BMI 26.15 kg/m²   Vitals:    01/18/23 1020   BP: 106/72   BP Location: Left arm   Patient Position: Sitting   Pulse: 80   Temp: 97.2 °F (36.2 °C)   SpO2: 100%   Weight: 78 kg (172 lb)   Height: 172.7 cm (68\")      Lab Results (most recent)     None        Physical Exam  Vitals reviewed.   Constitutional:       General: She is awake.      Appearance: Normal appearance. She is well-developed, well-groomed and overweight.   HENT:      Head: Normocephalic.   Eyes:      General: Lids are normal.   Neck:      Vascular: No carotid bruit, hepatojugular reflux or JVD.   Cardiovascular:      Rate and Rhythm: Normal rate and regular rhythm.      Pulses:           Radial pulses are 2+ on the right side and 2+ on the left side.        Dorsalis pedis pulses are 2+ on the right side and 2+ on the left side.        Posterior tibial pulses are 2+ on the right side and 2+ on the left side.      Heart sounds: Normal heart sounds.   Pulmonary:      Effort: Pulmonary effort is normal.      Breath sounds: Normal breath sounds and air entry.   Abdominal:      General: Bowel sounds are " normal.      Palpations: Abdomen is soft.   Musculoskeletal:      Right lower leg: No edema.      Left lower leg: No edema.   Skin:     General: Skin is warm and dry.   Neurological:      Mental Status: She is alert and oriented to person, place, and time.   Psychiatric:         Attention and Perception: Attention and perception normal.         Mood and Affect: Mood and affect normal.         Speech: Speech normal.         Behavior: Behavior normal. Behavior is cooperative.         Thought Content: Thought content normal.         Cognition and Memory: Cognition and memory normal.         Judgment: Judgment normal.         Procedure    Procedures        Assessment & Plan      Diagnosis Plan   1. Other chest pain  nitroglycerin (NITROSTAT) 0.4 MG SL tablet    Adult Transthoracic Echo Complete W/ Cont if Necessary Per Protocol    Treadmill Stress Test      2. Palpitations  Adult Transthoracic Echo Complete W/ Cont if Necessary Per Protocol    Treadmill Stress Test      3. Shortness of breath  Adult Transthoracic Echo Complete W/ Cont if Necessary Per Protocol    Treadmill Stress Test      4. Dizziness  Adult Transthoracic Echo Complete W/ Cont if Necessary Per Protocol    Treadmill Stress Test      5. History of COVID-19  Adult Transthoracic Echo Complete W/ Cont if Necessary Per Protocol    Treadmill Stress Test          Return in about 12 weeks (around 4/12/2023).    Chest pain/palpitation/shortness of breath/dizziness/history of COVID-patient have an ischemia work-up, stress and echo.  She was encouraged use nitro as needed for chest pain no resolution to go to the ER.  She will continue her medication regimen.  She will follow-up in 12 weeks or sooner if any changes are advised with testing.      Nesha Aviles  reports that she has never smoked. She has never used smokeless tobacco.. Patient brought in medicine list to appointment, it's been reviewed with patient and med list was updated in the chart.      Electronically signed by:

## 2023-01-18 NOTE — PROGRESS NOTES
Subjective   Nesha Aviles is a 32 y.o. female     Chief Complaint   Patient presents with   • Follow-up       HPI    Problem List:     1. Palpitations/tachycardia  1.1 event monitor 4/10-4/16/17-sinus rhythm, PACs, sinus tach   1.2 Holter 7/19-7/21/2022 - No ectopy   2. Shortness of breath   2.1 Echo 9/6/17-EF 55-60%, trace MR, physiological TR, PA 15-20  3. Chest Pain  3.1 stress test 3/13/2019-no evidence of ischemia, post-stress EF 65%  4. History of COVID-19 11/2020    Patient is a 32-year-old female who presents today for follow-up on her monitor.  She states that she gets this vice  feeling around her chest.  She says she is unsure if it is anxiety or not because she says she has chronic anxiety.  She does still have palpitations where she feels like her heart will race and pound.  She says she has them at least once a day but she will usually have one and then it stops.  She says it is always when she is doing the same thing such as bending over.  Patient does have some dizziness when she stands up too quickly.  She denies any presyncope, syncope, orthopnea, PND or edema.  She says she feels like she just cannot get a good breath and again she relates this to her anxiety.  She was previously supposed to have an ischemia work-up but she did not states she would like to get that rescheduled.    We went over her Holter.  Also her labs her LDL was 126, triglycerides 78, creatinine 0.67 and K was 3.9.      Current Outpatient Medications on File Prior to Visit   Medication Sig Dispense Refill   • propranolol (INDERAL) 10 MG tablet Take 10 mg by mouth 2 (Two) Times a Day.     • [DISCONTINUED] nitroglycerin (NITROSTAT) 0.4 MG SL tablet 1 under the tongue as needed for angina, may repeat q5mins for up three doses 25 tablet 3     No current facility-administered medications on file prior to visit.       ALLERGIES    Patient has no known allergies.    Past Medical History:   Diagnosis Date   • Arrhythmia    •  COVID-19     11/2020   • COVID-19 vaccine administered     03/10/2021, 05/04/2021 - Moderna   • Hx of iron deficiency anemia    • Hypothyroidism     when younger       Social History     Socioeconomic History   • Marital status:    Tobacco Use   • Smoking status: Never   • Smokeless tobacco: Never   Vaping Use   • Vaping Use: Never used   Substance and Sexual Activity   • Alcohol use: No   • Drug use: No       Family History   Problem Relation Age of Onset   • Hypertension Mother    • No Known Problems Father        Review of Systems   Constitutional: Negative for appetite change, chills, diaphoresis, fatigue and fever.   HENT: Negative for congestion, rhinorrhea and sore throat.    Eyes: Positive for visual disturbance (glasses and corrective lens ).   Respiratory: Positive for shortness of breath (due to anxiety , she feels like she cant get a good breath in ). Negative for cough, chest tightness and wheezing.    Cardiovascular: Positive for chest pain (vice  feeling around chest, unsure if anxiety or not) and palpitations (races, fluttering and pounds; has them fairly often, once a day, usually one beat, usually when bends over with same position ). Negative for leg swelling.   Gastrointestinal: Negative for abdominal pain, blood in stool, constipation, diarrhea, nausea and vomiting.   Endocrine: Negative for cold intolerance and heat intolerance.   Genitourinary: Negative for difficulty urinating, dysuria, frequency, hematuria and urgency.   Musculoskeletal: Negative for arthralgias, back pain, joint swelling, neck pain and neck stiffness.   Skin: Negative for color change, pallor, rash and wound.   Allergic/Immunologic: Negative for environmental allergies and food allergies.   Neurological: Positive for dizziness (due to her BP being low and when she stands up to quick ). Negative for syncope, weakness, light-headedness, numbness and headaches.   Hematological: Bruises/bleeds easily (BRusies and  "bleeds easy ).   Psychiatric/Behavioral: Negative for sleep disturbance. The patient is nervous/anxious (patient says she is chronically anxious ).        Objective   /72 (BP Location: Left arm, Patient Position: Sitting)   Pulse 80   Temp 97.2 °F (36.2 °C)   Ht 172.7 cm (68\")   Wt 78 kg (172 lb)   SpO2 100%   BMI 26.15 kg/m²   Vitals:    01/18/23 1020   BP: 106/72   BP Location: Left arm   Patient Position: Sitting   Pulse: 80   Temp: 97.2 °F (36.2 °C)   SpO2: 100%   Weight: 78 kg (172 lb)   Height: 172.7 cm (68\")      Lab Results (most recent)     None        Physical Exam  Vitals reviewed.   Constitutional:       General: She is awake.      Appearance: Normal appearance. She is well-developed, well-groomed and overweight.   HENT:      Head: Normocephalic.   Eyes:      General: Lids are normal.   Neck:      Vascular: No carotid bruit, hepatojugular reflux or JVD.   Cardiovascular:      Rate and Rhythm: Normal rate and regular rhythm.      Pulses:           Radial pulses are 2+ on the right side and 2+ on the left side.        Dorsalis pedis pulses are 2+ on the right side and 2+ on the left side.        Posterior tibial pulses are 2+ on the right side and 2+ on the left side.      Heart sounds: Normal heart sounds.   Pulmonary:      Effort: Pulmonary effort is normal.      Breath sounds: Normal breath sounds and air entry.   Abdominal:      General: Bowel sounds are normal.      Palpations: Abdomen is soft.   Musculoskeletal:      Right lower leg: No edema.      Left lower leg: No edema.   Skin:     General: Skin is warm and dry.   Neurological:      Mental Status: She is alert and oriented to person, place, and time.   Psychiatric:         Attention and Perception: Attention and perception normal.         Mood and Affect: Mood and affect normal.         Speech: Speech normal.         Behavior: Behavior normal. Behavior is cooperative.         Thought Content: Thought content normal.         Cognition " and Memory: Cognition and memory normal.         Judgment: Judgment normal.         Procedure   Procedures         Assessment & Plan      Diagnosis Plan   1. Other chest pain  nitroglycerin (NITROSTAT) 0.4 MG SL tablet    Adult Transthoracic Echo Complete W/ Cont if Necessary Per Protocol    Treadmill Stress Test      2. Palpitations  Adult Transthoracic Echo Complete W/ Cont if Necessary Per Protocol    Treadmill Stress Test      3. Shortness of breath  Adult Transthoracic Echo Complete W/ Cont if Necessary Per Protocol    Treadmill Stress Test      4. Dizziness  Adult Transthoracic Echo Complete W/ Cont if Necessary Per Protocol    Treadmill Stress Test      5. History of COVID-19  Adult Transthoracic Echo Complete W/ Cont if Necessary Per Protocol    Treadmill Stress Test          Return in about 12 weeks (around 4/12/2023).    Chest pain/palpitation/shortness of breath/dizziness/history of COVID-patient have an ischemia work-up, stress and echo.  She was encouraged use nitro as needed for chest pain no resolution to go to the ER.  She will continue her medication regimen.  She will follow-up in 12 weeks or sooner if any changes are advised with testing.       Nesha Aviles  reports that she has never smoked. She has never used smokeless tobacco.. Patient brought in medicine list to appointment, it's been reviewed with patient and med list was updated in the chart.     Electronically signed by:

## 2023-03-14 ENCOUNTER — HOSPITAL ENCOUNTER (OUTPATIENT)
Dept: CARDIOLOGY | Facility: HOSPITAL | Age: 33
Discharge: HOME | End: 2023-03-14
Payer: COMMERCIAL

## 2023-03-14 DIAGNOSIS — R07.89 OTHER CHEST PAIN: ICD-10-CM

## 2023-03-14 DIAGNOSIS — R42 DIZZINESS: ICD-10-CM

## 2023-03-14 DIAGNOSIS — Z86.16 HISTORY OF COVID-19: ICD-10-CM

## 2023-03-14 DIAGNOSIS — R00.2 PALPITATIONS: ICD-10-CM

## 2023-03-14 DIAGNOSIS — R06.02 SHORTNESS OF BREATH: ICD-10-CM

## 2023-03-14 LAB
BH CV ECHO MEAS - ACS: 2.14 CM
BH CV ECHO MEAS - AO MAX PG: 5.1 MMHG
BH CV ECHO MEAS - AO MEAN PG: 2.7 MMHG
BH CV ECHO MEAS - AO ROOT DIAM: 2.42 CM
BH CV ECHO MEAS - AO V2 MAX: 112.7 CM/SEC
BH CV ECHO MEAS - AO V2 VTI: 24.2 CM
BH CV ECHO MEAS - EDV(CUBED): 46.7 ML
BH CV ECHO MEAS - EDV(MOD-SP4): 39.7 ML
BH CV ECHO MEAS - EF(MOD-SP4): 59.7 %
BH CV ECHO MEAS - EF_3D-VOL: 57 %
BH CV ECHO MEAS - ESV(CUBED): 9 ML
BH CV ECHO MEAS - ESV(MOD-SP4): 16 ML
BH CV ECHO MEAS - FS: 42.2 %
BH CV ECHO MEAS - IVS/LVPW: 0.92 CM
BH CV ECHO MEAS - IVSD: 1.22 CM
BH CV ECHO MEAS - LA DIMENSION: 3.1 CM
BH CV ECHO MEAS - LAT PEAK E' VEL: 18.3 CM/SEC
BH CV ECHO MEAS - LV DIASTOLIC VOL/BSA (35-75): 20.7 CM2
BH CV ECHO MEAS - LV MASS(C)D: 154.4 GRAMS
BH CV ECHO MEAS - LV SYSTOLIC VOL/BSA (12-30): 8.3 CM2
BH CV ECHO MEAS - LVIDD: 3.6 CM
BH CV ECHO MEAS - LVIDS: 2.08 CM
BH CV ECHO MEAS - LVPWD: 1.32 CM
BH CV ECHO MEAS - MED PEAK E' VEL: 11 CM/SEC
BH CV ECHO MEAS - MV A MAX VEL: 31.3 CM/SEC
BH CV ECHO MEAS - MV DEC SLOPE: 304.4 CM/SEC2
BH CV ECHO MEAS - MV E MAX VEL: 77.6 CM/SEC
BH CV ECHO MEAS - MV E/A: 2.48
BH CV ECHO MEAS - RVDD: 1.88 CM
BH CV ECHO MEAS - SI(MOD-SP4): 12.4 ML/M2
BH CV ECHO MEAS - SV(MOD-SP4): 23.7 ML
BH CV ECHO MEASUREMENTS AVERAGE E/E' RATIO: 5.3
BH CV STRESS RECOVERY BP: NORMAL MMHG
BH CV STRESS RECOVERY HR: 90 BPM
LEFT ATRIUM VOLUME INDEX: 16 ML/M2
MAXIMAL PREDICTED HEART RATE: 186 BPM
MAXIMAL PREDICTED HEART RATE: 186 BPM
PERCENT MAX PREDICTED HR: 87.1 %
STRESS BASELINE BP: NORMAL MMHG
STRESS BASELINE HR: 71 BPM
STRESS PERCENT HR: 102 %
STRESS POST ESTIMATED WORKLOAD: 10.1 METS
STRESS POST EXERCISE DUR MIN: 7 MIN
STRESS POST EXERCISE DUR SEC: 5 SEC
STRESS POST PEAK BP: NORMAL MMHG
STRESS POST PEAK HR: 162 BPM
STRESS TARGET HR: 158 BPM
STRESS TARGET HR: 158 BPM

## 2023-03-14 PROCEDURE — 93306 TTE W/DOPPLER COMPLETE: CPT

## 2023-03-14 PROCEDURE — 93017 CV STRESS TEST TRACING ONLY: CPT

## 2023-03-14 PROCEDURE — 93018 CV STRESS TEST I&R ONLY: CPT | Performed by: INTERNAL MEDICINE

## 2023-03-15 ENCOUNTER — TELEPHONE (OUTPATIENT)
Dept: CARDIOLOGY | Facility: CLINIC | Age: 33
End: 2023-03-15
Payer: COMMERCIAL

## 2023-03-15 NOTE — TELEPHONE ENCOUNTER
STRESS  Pt notified of no acute findings. Provider will discuss results at f/u. Pt reminded of appt date and time.  ----- Message from Trya Givens MA sent at 3/15/2023  1:16 PM EDT -----    ----- Message -----  From: Mario Connors APRN  Sent: 3/15/2023   9:08 AM EDT  To: Tyra Givens MA    Patient was found to have a normal stress test with no evidence of ischemia.  Keep follow-up.  ----- Message -----  From: Sarmad Patel MD  Sent: 3/14/2023   9:43 PM EDT  To: ABEL Palacios

## 2023-04-11 LAB
BH CV ECHO MEAS - ACS: 2.14 CM
BH CV ECHO MEAS - AO MAX PG: 5.1 MMHG
BH CV ECHO MEAS - AO MEAN PG: 2.7 MMHG
BH CV ECHO MEAS - AO ROOT DIAM: 2.42 CM
BH CV ECHO MEAS - AO V2 MAX: 112.7 CM/SEC
BH CV ECHO MEAS - AO V2 VTI: 24.2 CM
BH CV ECHO MEAS - EDV(CUBED): 46.7 ML
BH CV ECHO MEAS - EDV(MOD-SP4): 39.7 ML
BH CV ECHO MEAS - EF(MOD-SP4): 59.7 %
BH CV ECHO MEAS - EF_3D-VOL: 57 %
BH CV ECHO MEAS - ESV(CUBED): 9 ML
BH CV ECHO MEAS - ESV(MOD-SP4): 16 ML
BH CV ECHO MEAS - FS: 42.2 %
BH CV ECHO MEAS - IVS/LVPW: 0.92 CM
BH CV ECHO MEAS - IVSD: 1.22 CM
BH CV ECHO MEAS - LA DIMENSION: 3.1 CM
BH CV ECHO MEAS - LAT PEAK E' VEL: 18.3 CM/SEC
BH CV ECHO MEAS - LV DIASTOLIC VOL/BSA (35-75): 20.7 CM2
BH CV ECHO MEAS - LV MASS(C)D: 154.4 GRAMS
BH CV ECHO MEAS - LV SYSTOLIC VOL/BSA (12-30): 8.3 CM2
BH CV ECHO MEAS - LVIDD: 3.6 CM
BH CV ECHO MEAS - LVIDS: 2.08 CM
BH CV ECHO MEAS - LVPWD: 1.32 CM
BH CV ECHO MEAS - MED PEAK E' VEL: 11 CM/SEC
BH CV ECHO MEAS - MV A MAX VEL: 31.3 CM/SEC
BH CV ECHO MEAS - MV DEC SLOPE: 304.4 CM/SEC2
BH CV ECHO MEAS - MV E MAX VEL: 77.6 CM/SEC
BH CV ECHO MEAS - MV E/A: 2.48
BH CV ECHO MEAS - RVDD: 1.88 CM
BH CV ECHO MEAS - SI(MOD-SP4): 12.4 ML/M2
BH CV ECHO MEAS - SV(MOD-SP4): 23.7 ML
BH CV ECHO MEASUREMENTS AVERAGE E/E' RATIO: 5.3
LEFT ATRIUM VOLUME INDEX: 16 ML/M2
MAXIMAL PREDICTED HEART RATE: 186 BPM
STRESS TARGET HR: 158 BPM

## 2023-04-13 ENCOUNTER — TELEPHONE (OUTPATIENT)
Dept: CARDIOLOGY | Facility: CLINIC | Age: 33
End: 2023-04-13
Payer: COMMERCIAL

## 2023-04-13 NOTE — TELEPHONE ENCOUNTER
Called patient with echo results.     •  Left ventricular systolic function is normal. Left ventricular ejection fraction appears to be 66 - 70%.  •  Left ventricular wall thickness is consistent with mild concentric hypertrophy.  •  Left ventricular diastolic function was normal.    ----- Message from Lizy Herbert MA sent at 4/13/2023  9:18 AM EDT -----  I called patient to reschedule her appointment. She states she received a call regarding her stress test results but never heard back from Shoshana's nurse regarding the echo results. She is requesting a nurse call her to discuss these at [phone]. Thank you

## 2023-04-21 ENCOUNTER — TELEPHONE (OUTPATIENT)
Dept: CARDIOLOGY | Facility: CLINIC | Age: 33
End: 2023-04-21
Payer: COMMERCIAL

## 2023-04-21 NOTE — TELEPHONE ENCOUNTER
----- Message from Jose Cuevas Rep sent at 4/21/2023 10:49 AM EDT -----    ----- Message -----  From: Ingrid Noe RegSched Rep  Sent: 4/19/2023  12:56 PM EDT  To: ZULY Stanton      ----- Message -----  From: Mario Connors APRN  Sent: 4/19/2023  12:37 PM EDT  To: Tyra Givens MA    There is no acute findings on the echocardiogram.  Keep follow-up.